# Patient Record
Sex: MALE | Race: WHITE | Employment: UNEMPLOYED | URBAN - METROPOLITAN AREA
[De-identification: names, ages, dates, MRNs, and addresses within clinical notes are randomized per-mention and may not be internally consistent; named-entity substitution may affect disease eponyms.]

---

## 2017-01-22 ENCOUNTER — HOSPITAL ENCOUNTER (INPATIENT)
Age: 52
LOS: 2 days | Discharge: HOME | DRG: 638 | End: 2017-01-24
Attending: EMERGENCY MEDICINE | Admitting: INTERNAL MEDICINE
Payer: MEDICARE

## 2017-01-22 ENCOUNTER — APPOINTMENT (OUTPATIENT)
Dept: GENERAL RADIOLOGY | Age: 52
DRG: 638 | End: 2017-01-22
Attending: INTERNAL MEDICINE
Payer: MEDICARE

## 2017-01-22 DIAGNOSIS — E86.0 DEHYDRATION: ICD-10-CM

## 2017-01-22 DIAGNOSIS — E10.10 DIABETIC KETOACIDOSIS WITHOUT COMA ASSOCIATED WITH TYPE 1 DIABETES MELLITUS (HCC): Primary | ICD-10-CM

## 2017-01-22 PROBLEM — E11.10 DKA (DIABETIC KETOACIDOSES): Status: ACTIVE | Noted: 2017-01-22

## 2017-01-22 LAB
ALBUMIN SERPL BCP-MCNC: 4.1 G/DL (ref 3.5–5)
ALBUMIN/GLOB SERPL: 1.3 {RATIO} (ref 1.1–2.2)
ALP SERPL-CCNC: 87 U/L (ref 45–117)
ALT SERPL-CCNC: 23 U/L (ref 12–78)
AMYLASE SERPL-CCNC: 31 U/L (ref 25–115)
ANION GAP BLD CALC-SCNC: 25 MMOL/L (ref 5–15)
ARTERIAL PATENCY WRIST A: ABNORMAL
AST SERPL W P-5'-P-CCNC: 15 U/L (ref 15–37)
BASE DEFICIT BLDV-SCNC: 20 MMOL/L
BASOPHILS # BLD AUTO: 0 K/UL (ref 0–0.1)
BASOPHILS # BLD: 0 % (ref 0–1)
BDY SITE: ABNORMAL
BILIRUB SERPL-MCNC: 0.9 MG/DL (ref 0.2–1)
BUN SERPL-MCNC: 24 MG/DL (ref 6–20)
BUN/CREAT SERPL: 18 (ref 12–20)
CALCIUM SERPL-MCNC: 8.7 MG/DL (ref 8.5–10.1)
CHLORIDE SERPL-SCNC: 88 MMOL/L (ref 97–108)
CK MB CFR SERPL CALC: 1.1 % (ref 0–2.5)
CK MB SERPL-MCNC: 1 NG/ML (ref 5–25)
CK SERPL-CCNC: 91 U/L (ref 39–308)
CO2 SERPL-SCNC: 10 MMOL/L (ref 21–32)
CREAT SERPL-MCNC: 1.35 MG/DL (ref 0.7–1.3)
DIFFERENTIAL METHOD BLD: ABNORMAL
EOSINOPHIL # BLD: 0 K/UL (ref 0–0.4)
EOSINOPHIL NFR BLD: 0 % (ref 0–7)
ERYTHROCYTE [DISTWIDTH] IN BLOOD BY AUTOMATED COUNT: 12.6 % (ref 11.5–14.5)
EST. AVERAGE GLUCOSE BLD GHB EST-MCNC: 292 MG/DL
GAS FLOW.O2 O2 DELIVERY SYS: ABNORMAL L/MIN
GLOBULIN SER CALC-MCNC: 3.2 G/DL (ref 2–4)
GLUCOSE BLD STRIP.AUTO-MCNC: 129 MG/DL (ref 65–100)
GLUCOSE BLD STRIP.AUTO-MCNC: 135 MG/DL (ref 65–100)
GLUCOSE BLD STRIP.AUTO-MCNC: 168 MG/DL (ref 65–100)
GLUCOSE BLD STRIP.AUTO-MCNC: 231 MG/DL (ref 65–100)
GLUCOSE BLD STRIP.AUTO-MCNC: 304 MG/DL (ref 65–100)
GLUCOSE BLD STRIP.AUTO-MCNC: 404 MG/DL (ref 65–100)
GLUCOSE BLD STRIP.AUTO-MCNC: 433 MG/DL (ref 65–100)
GLUCOSE SERPL-MCNC: 438 MG/DL (ref 65–100)
HBA1C MFR BLD: 11.8 % (ref 4.2–6.3)
HCO3 BLDV-SCNC: 9.3 MMOL/L (ref 23–28)
HCT VFR BLD AUTO: 46.2 % (ref 36.6–50.3)
HGB BLD-MCNC: 16.2 G/DL (ref 12.1–17)
LIPASE SERPL-CCNC: 58 U/L (ref 73–393)
LYMPHOCYTES # BLD AUTO: 15 % (ref 12–49)
LYMPHOCYTES # BLD: 2.4 K/UL (ref 0.8–3.5)
MAGNESIUM SERPL-MCNC: 1.7 MG/DL (ref 1.6–2.4)
MCH RBC QN AUTO: 31.9 PG (ref 26–34)
MCHC RBC AUTO-ENTMCNC: 35.1 G/DL (ref 30–36.5)
MCV RBC AUTO: 90.9 FL (ref 80–99)
MONOCYTES # BLD: 1 K/UL (ref 0–1)
MONOCYTES NFR BLD AUTO: 6 % (ref 5–13)
NEUTS SEG # BLD: 12.6 K/UL (ref 1.8–8)
NEUTS SEG NFR BLD AUTO: 79 % (ref 32–75)
O2/TOTAL GAS SETTING VFR VENT: 0.21 %
PCO2 BLDV: 27.4 MMHG (ref 41–51)
PH BLDV: 7.14 [PH] (ref 7.32–7.42)
PLATELET # BLD AUTO: 359 K/UL (ref 150–400)
PO2 BLDV: 34 MMHG (ref 25–40)
POTASSIUM SERPL-SCNC: 4.9 MMOL/L (ref 3.5–5.1)
PROT SERPL-MCNC: 7.3 G/DL (ref 6.4–8.2)
RBC # BLD AUTO: 5.08 M/UL (ref 4.1–5.7)
RBC MORPH BLD: ABNORMAL
SAO2 % BLDV: 49 % (ref 65–88)
SERVICE CMNT-IMP: ABNORMAL
SODIUM SERPL-SCNC: 123 MMOL/L (ref 136–145)
SPECIMEN TYPE: ABNORMAL
TROPONIN I SERPL-MCNC: <0.04 NG/ML
VALPROATE SERPL-MCNC: 14 UG/ML (ref 50–100)
WBC # BLD AUTO: 16 K/UL (ref 4.1–11.1)

## 2017-01-22 PROCEDURE — 74011250636 HC RX REV CODE- 250/636: Performed by: EMERGENCY MEDICINE

## 2017-01-22 PROCEDURE — 80164 ASSAY DIPROPYLACETIC ACD TOT: CPT | Performed by: INTERNAL MEDICINE

## 2017-01-22 PROCEDURE — 96365 THER/PROPH/DIAG IV INF INIT: CPT

## 2017-01-22 PROCEDURE — 36415 COLL VENOUS BLD VENIPUNCTURE: CPT | Performed by: INTERNAL MEDICINE

## 2017-01-22 PROCEDURE — 82803 BLOOD GASES ANY COMBINATION: CPT

## 2017-01-22 PROCEDURE — 82150 ASSAY OF AMYLASE: CPT | Performed by: INTERNAL MEDICINE

## 2017-01-22 PROCEDURE — 84484 ASSAY OF TROPONIN QUANT: CPT | Performed by: INTERNAL MEDICINE

## 2017-01-22 PROCEDURE — 83036 HEMOGLOBIN GLYCOSYLATED A1C: CPT | Performed by: EMERGENCY MEDICINE

## 2017-01-22 PROCEDURE — 74011250637 HC RX REV CODE- 250/637: Performed by: INTERNAL MEDICINE

## 2017-01-22 PROCEDURE — 85025 COMPLETE CBC W/AUTO DIFF WBC: CPT | Performed by: EMERGENCY MEDICINE

## 2017-01-22 PROCEDURE — 82962 GLUCOSE BLOOD TEST: CPT

## 2017-01-22 PROCEDURE — 71010 XR CHEST PORT: CPT

## 2017-01-22 PROCEDURE — 82550 ASSAY OF CK (CPK): CPT | Performed by: INTERNAL MEDICINE

## 2017-01-22 PROCEDURE — 65620000000 HC RM CCU GENERAL

## 2017-01-22 PROCEDURE — 93005 ELECTROCARDIOGRAM TRACING: CPT

## 2017-01-22 PROCEDURE — 74011636637 HC RX REV CODE- 636/637: Performed by: EMERGENCY MEDICINE

## 2017-01-22 PROCEDURE — 83735 ASSAY OF MAGNESIUM: CPT | Performed by: EMERGENCY MEDICINE

## 2017-01-22 PROCEDURE — 74011000258 HC RX REV CODE- 258: Performed by: INTERNAL MEDICINE

## 2017-01-22 PROCEDURE — 80307 DRUG TEST PRSMV CHEM ANLYZR: CPT | Performed by: INTERNAL MEDICINE

## 2017-01-22 PROCEDURE — 99285 EMERGENCY DEPT VISIT HI MDM: CPT

## 2017-01-22 PROCEDURE — 80053 COMPREHEN METABOLIC PANEL: CPT | Performed by: EMERGENCY MEDICINE

## 2017-01-22 PROCEDURE — 81001 URINALYSIS AUTO W/SCOPE: CPT | Performed by: EMERGENCY MEDICINE

## 2017-01-22 PROCEDURE — 83690 ASSAY OF LIPASE: CPT | Performed by: INTERNAL MEDICINE

## 2017-01-22 PROCEDURE — 74011000258 HC RX REV CODE- 258: Performed by: EMERGENCY MEDICINE

## 2017-01-22 PROCEDURE — 74011250636 HC RX REV CODE- 250/636: Performed by: INTERNAL MEDICINE

## 2017-01-22 PROCEDURE — A9270 NON-COVERED ITEM OR SERVICE: HCPCS | Performed by: EMERGENCY MEDICINE

## 2017-01-22 RX ORDER — ACETAMINOPHEN 325 MG/1
650 TABLET ORAL
Status: DISCONTINUED | OUTPATIENT
Start: 2017-01-22 | End: 2017-01-24 | Stop reason: HOSPADM

## 2017-01-22 RX ORDER — MAGNESIUM SULFATE 100 %
4 CRYSTALS MISCELLANEOUS AS NEEDED
Status: DISCONTINUED | OUTPATIENT
Start: 2017-01-22 | End: 2017-01-24 | Stop reason: HOSPADM

## 2017-01-22 RX ORDER — SODIUM CHLORIDE 0.9 % (FLUSH) 0.9 %
5-10 SYRINGE (ML) INJECTION AS NEEDED
Status: DISCONTINUED | OUTPATIENT
Start: 2017-01-22 | End: 2017-01-24 | Stop reason: HOSPADM

## 2017-01-22 RX ORDER — SODIUM CHLORIDE 0.9 % (FLUSH) 0.9 %
5-10 SYRINGE (ML) INJECTION EVERY 8 HOURS
Status: DISCONTINUED | OUTPATIENT
Start: 2017-01-22 | End: 2017-01-24 | Stop reason: HOSPADM

## 2017-01-22 RX ORDER — HYDROMORPHONE HYDROCHLORIDE 1 MG/ML
1 INJECTION, SOLUTION INTRAMUSCULAR; INTRAVENOUS; SUBCUTANEOUS
Status: DISCONTINUED | OUTPATIENT
Start: 2017-01-22 | End: 2017-01-24 | Stop reason: HOSPADM

## 2017-01-22 RX ORDER — SODIUM CHLORIDE 9 MG/ML
150 INJECTION, SOLUTION INTRAVENOUS CONTINUOUS
Status: DISCONTINUED | OUTPATIENT
Start: 2017-01-22 | End: 2017-01-22

## 2017-01-22 RX ORDER — DEXTROSE MONOHYDRATE AND SODIUM CHLORIDE 5; .9 G/100ML; G/100ML
150 INJECTION, SOLUTION INTRAVENOUS CONTINUOUS
Status: DISCONTINUED | OUTPATIENT
Start: 2017-01-22 | End: 2017-01-23

## 2017-01-22 RX ORDER — DEXTROSE 50 % IN WATER (D50W) INTRAVENOUS SYRINGE
12.5-25 AS NEEDED
Status: DISCONTINUED | OUTPATIENT
Start: 2017-01-22 | End: 2017-01-24 | Stop reason: HOSPADM

## 2017-01-22 RX ORDER — GUAIFENESIN 100 MG/5ML
81 LIQUID (ML) ORAL DAILY
Status: DISCONTINUED | OUTPATIENT
Start: 2017-01-23 | End: 2017-01-24 | Stop reason: HOSPADM

## 2017-01-22 RX ORDER — ONDANSETRON 2 MG/ML
4 INJECTION INTRAMUSCULAR; INTRAVENOUS
Status: DISCONTINUED | OUTPATIENT
Start: 2017-01-22 | End: 2017-01-24 | Stop reason: HOSPADM

## 2017-01-22 RX ORDER — INSULIN LISPRO 100 [IU]/ML
INJECTION, SOLUTION INTRAVENOUS; SUBCUTANEOUS
Status: DISCONTINUED | OUTPATIENT
Start: 2017-01-22 | End: 2017-01-23

## 2017-01-22 RX ORDER — LISINOPRIL 20 MG/1
20 TABLET ORAL DAILY
Status: DISCONTINUED | OUTPATIENT
Start: 2017-01-23 | End: 2017-01-24 | Stop reason: HOSPADM

## 2017-01-22 RX ORDER — ENOXAPARIN SODIUM 100 MG/ML
40 INJECTION SUBCUTANEOUS EVERY 24 HOURS
Status: DISCONTINUED | OUTPATIENT
Start: 2017-01-22 | End: 2017-01-24 | Stop reason: HOSPADM

## 2017-01-22 RX ORDER — HYDROCODONE BITARTRATE AND ACETAMINOPHEN 5; 325 MG/1; MG/1
1 TABLET ORAL
Status: DISCONTINUED | OUTPATIENT
Start: 2017-01-22 | End: 2017-01-24 | Stop reason: HOSPADM

## 2017-01-22 RX ORDER — ZIPRASIDONE HYDROCHLORIDE 20 MG/1
80 CAPSULE ORAL 2 TIMES DAILY WITH MEALS
Status: DISCONTINUED | OUTPATIENT
Start: 2017-01-23 | End: 2017-01-24 | Stop reason: HOSPADM

## 2017-01-22 RX ORDER — DIVALPROEX SODIUM 500 MG/1
500 TABLET, EXTENDED RELEASE ORAL 3 TIMES DAILY
Status: DISCONTINUED | OUTPATIENT
Start: 2017-01-22 | End: 2017-01-24 | Stop reason: HOSPADM

## 2017-01-22 RX ADMIN — ENOXAPARIN SODIUM 40 MG: 40 INJECTION SUBCUTANEOUS at 20:40

## 2017-01-22 RX ADMIN — SODIUM CHLORIDE 1000 ML: 900 INJECTION, SOLUTION INTRAVENOUS at 18:47

## 2017-01-22 RX ADMIN — HUMAN INSULIN 10 UNITS: 100 INJECTION, SOLUTION SUBCUTANEOUS at 17:30

## 2017-01-22 RX ADMIN — SODIUM CHLORIDE 150 ML/HR: 900 INJECTION, SOLUTION INTRAVENOUS at 18:47

## 2017-01-22 RX ADMIN — SODIUM CHLORIDE 1000 ML: 900 INJECTION, SOLUTION INTRAVENOUS at 20:41

## 2017-01-22 RX ADMIN — Medication 10 ML: at 21:16

## 2017-01-22 RX ADMIN — SODIUM CHLORIDE 7.5 UNITS/HR: 900 INJECTION, SOLUTION INTRAVENOUS at 17:38

## 2017-01-22 RX ADMIN — DIVALPROEX SODIUM 500 MG: 500 TABLET, EXTENDED RELEASE ORAL at 21:16

## 2017-01-22 RX ADMIN — DEXTROSE MONOHYDRATE AND SODIUM CHLORIDE 150 ML/HR: 5; .9 INJECTION, SOLUTION INTRAVENOUS at 21:42

## 2017-01-22 NOTE — ED PROVIDER NOTES
HPI Comments: 46 y.o. male with past medical history significant for DM, HTN, appendectomy, who presents with chief complaint of vomiting. Pt reports that he has been out of his long acting humulin for the past three days, and has only been taking his short acting humalog with meals, but has had BS consistently over 300. He reports sxs of nausea/vomiting, despite trying to drink fluids, in addition to polyuria, headache, and a mild cough. Pt states that his endocrinologist called in the Rx, but that his pharmacy never received the Rx. He reports hx of DKA. He states that PTA his BS was 520. Pt denies fever. There are no other acute medical concerns at this time. PCP: Mae De León MD  Endocrinologist: Camille Burden MD    Note written by Alice Solitario, as dictated by Tawanna Millan MD 4:34 PM      The history is provided by the patient. Past Medical History:   Diagnosis Date    [facility]ipolar 1 disorder ([facility]     Depressive disorder     Diabetes ([facility]      Type 1    Hypertension     Psychiatric disorder      bipolar    PTSD (post-traumatic stress disorder)     PTSD (post-traumatic stress disorder)        Past Surgical History:   Procedure Laterality Date    Hx appendectomy           History reviewed. No pertinent family history. Social History     Social History    Marital status:      Spouse name: N/A    Number of children: N/A    Years of education: N/A     Occupational History    Not on file. Social History Main Topics    Smoking status: Current Every Day Smoker     Packs/day: 1.00     Years: 0.00    Smokeless tobacco: Not on file    Alcohol use No      Comment: occasion    Drug use: Yes     Special: Heroin      Comment: Uses Heroin everyday    Sexual activity: Not on file     Other Topics Concern    Not on file     Social History Narrative         ALLERGIES: Review of patient's allergies indicates no known allergies.     Review of Systems   Constitutional: Negative for activity change and fever. HENT: Negative for congestion. Eyes: Negative for pain. Respiratory: Positive for cough. Negative for shortness of breath. Cardiovascular: Negative for chest pain and leg swelling. Gastrointestinal: Positive for nausea and vomiting. Negative for abdominal pain. Endocrine: Positive for polydipsia, polyphagia and polyuria. Genitourinary: Positive for frequency (increased). Negative for flank pain and hematuria. Musculoskeletal: Negative for gait problem, neck pain and neck stiffness. Skin: Negative for color change. Neurological: Positive for headaches. Negative for speech difficulty. Hematological: Does not bruise/bleed easily. Psychiatric/Behavioral: Negative for confusion. All other systems reviewed and are negative. Vitals:    01/22/17 1618   BP: 133/84   Pulse: (!) 142   Resp: 14   Temp: 97.8 °F (36.6 °C)   SpO2: 96%   Weight: 73 kg (161 lb)   Height: 5' 9\" (1.753 m)            Physical Exam   Constitutional: He is oriented to person, place, and time. He appears well-developed and well-nourished. No distress. HENT:   Head: Normocephalic and atraumatic. Right Ear: External ear normal.   Left Ear: External ear normal.   Mouth/Throat: Mucous membranes are dry. Eyes: EOM are normal. Pupils are equal, round, and reactive to light. Neck: Normal range of motion. Neck supple. No JVD present. No tracheal deviation present. Cardiovascular: Regular rhythm and normal heart sounds. Tachycardia present. Exam reveals no gallop and no friction rub. No murmur heard. 's-130's. Good pulses in all 4 extremities. Pulmonary/Chest: Effort normal and breath sounds normal. No stridor. No respiratory distress. He has no wheezes. He has no rales. Abdominal: Soft. Bowel sounds are normal. He exhibits no distension. There is no tenderness. There is no rebound and no guarding. Musculoskeletal: Normal range of motion.  He exhibits no edema or tenderness. No leg edema   Neurological: He is alert and oriented to person, place, and time. He has normal reflexes. No cranial nerve deficit. Coordination normal.   Skin: Skin is warm and dry. No rash noted. He is not diaphoretic. No erythema. Psychiatric: He has a normal mood and affect. His behavior is normal. Judgment and thought content normal.   Nursing note and vitals reviewed. Note written by Darylene Abbot, Scribe, as dictated by Charleen Guerra MD 4:35 PM      MDM  Number of Diagnoses or Management Options  Dehydration:   Diabetic ketoacidosis without coma associated with type 1 diabetes mellitus [facility]):   Diagnosis management comments: 66-year-old white male with history of type 1 diabetes presents to the emergency department with hyperglycemia. Patient has been out of his long-acting insulin for the last several days. Patient presents with dehydration, urinary frequency, elevated blood sugar. We'll check for DKA. Will also give IV fluids. We'll reassess when testing his back. Patient agrees with this plan.        Amount and/or Complexity of Data Reviewed  Clinical lab tests: ordered and reviewed  Tests in the radiology section of CPT®: reviewed and ordered  Tests in the medicine section of CPT®: ordered and reviewed  Discussion of test results with the performing providers: yes  Decide to obtain previous medical records or to obtain history from someone other than the patient: yes  Obtain history from someone other than the patient: yes  Review and summarize past medical records: yes  Discuss the patient with other providers: yes  Independent visualization of images, tracings, or specimens: yes    Risk of Complications, Morbidity, and/or Mortality  Presenting problems: high  Diagnostic procedures: high  Management options: high    Critical Care  Total time providing critical care: 30-74 minutes    Patient Progress  Patient progress: improved    ED Course       Procedures    ED EKG interpretation:  Rhythm: sinus tachycardia; and regular . Rate (approx.): 113; Axis: left axis deviation; ST/T wave: no elevations or depressions; normal intervals  Note written by Alice Quan, as dictated by Sky Stahl MD 4:45 PM       PROGRESS NOTE:  4:46 PM  Will start with one dose of 10 units insulin until we know if pt is in DKA. Labs reviewed. PH is 7.1, glucose in the 400s    Bicarb is 10, AG is 25    PROGRESS NOTE:  5:07 PM  Pt is confirmed to be in DKA. Will continue IV fluids and start insulin drip. Total critical care time spend exclusive of procedures:  35 mins    Pt is now on insulin drip    IVF running as well    CONSULT NOTE:  5:20 PM Sky Stahl MD spoke with Dr. Lizeth Bhatti, Consult for Hospitalist.  Discussed available diagnostic tests and clinical findings. Dr. Salinas Sanchez will admit.

## 2017-01-22 NOTE — PROGRESS NOTES
Admission Medication Reconciliation:    Information obtained from: Patient    Significant PMH/Disease States:   Past Medical History   Diagnosis Date    [facility] 1 disorder ([facility]     Depressive disorder     Diabetes ([facility]      Type 1    Hypertension     Psychiatric disorder      bipolar    PTSD (post-traumatic stress disorder)     PTSD (post-traumatic stress disorder)        Chief Complaint for this Admission:  Elevated BG    Allergies:  Review of patient's allergies indicates no known allergies. Prior to Admission Medications:   Prior to Admission Medications   Prescriptions Last Dose Informant Patient Reported? Taking?   aspirin 81 mg chewable tablet Not Taking at Unknown time  Yes No   Sig: Take 81 mg by mouth daily. divalproex ER (DEPAKOTE ER) 500 mg ER tablet 2017 at Unknown time  Yes Yes   Sig: Take 500 mg by mouth three (3) times daily. insulin NPH (NOVOLIN N, HUMULIN N) 100 unit/mL injection   Yes No   Si Units by SubCUTAneous route ACB/HS. Indications: DIABETES MELLITUS   insulin lispro (HUMALOG) 100 unit/mL injection 2017 at 0800  No Yes   Sig: Take 10 units , subcutaneously , prior to breakfast, lunch and dinner. IF your blood sugar >200 take 20 units subcutaneously, IF your blood sugar is >300 take 30 units subcutaneously. IF your blood sugar is >400 call your physician or come to ER. Patient taking differently: Take 10 units , subcutaneously , prior to breakfast, lunch and dinner. IF your blood sugar >200 take 15 units subcutaneously, IF your blood sugar is >300 take 20 units subcutaneously. IF your blood sugar is >400 call your physician or come to ER. lisinopril (PRINIVIL, ZESTRIL) 20 mg tablet 1/15/2017 at Unknown time  Yes Yes   Sig: Take 20 mg by mouth daily. ziprasidone (GEODON) 80 mg capsule 2017 at Unknown time  Yes Yes   Sig: Take 80 mg by mouth two (2) times daily (with meals).       Facility-Administered Medications: None         Comments/Recommendations: Updated medication list to reflect current meds. Deleted trazodone, citalopram. Updated sliding scale.     Maggie Hickman, RaphaelD, BCPS

## 2017-01-22 NOTE — ROUTINE PROCESS
TRANSFER - OUT REPORT:    Verbal report given to 1200 Magdy Olmedo RN(name) on Ed Grace  being transferred to CCU 24(unit) for routine progression of care       Report consisted of patients Situation, Background, Assessment and   Recommendations(SBAR). Information from the following report(s) SBAR and Kardex was reviewed with the receiving nurse. Lines:       Opportunity for questions and clarification was provided.       Patient transported with:   [employer]

## 2017-01-22 NOTE — IP AVS SNAPSHOT
[address] 
[phone] Patient: Adele Martines MRN: [id number] CJN:[id number] You are allergic to the following No active allergies Recent Documentation Height Weight BMI Smoking Status 1.753 m 77 kg 25.07 kg/m2 Current Every Day Smoker Emergency Contacts Name Discharge Info Relation Home Work Mobile Jean-Paul Mena DISCHARGE CAREGIVER [3] Father [15] [phone] About your hospitalization You were admitted on:  January 22, 2017 You last received care in the:  [facility] 4 [facility] You were discharged on:  January 24, 2017 Unit phone number:  [phone] Why you were hospitalized Your primary diagnosis was:  Dka (Diabetic Ketoacidoses) (Hcc) Your diagnoses also included:  Dka (Diabetic Ketoacidoses) (Hcc) Providers Seen During Your Hospitalizations Provider Role Specialty Primary office phone Andrew Brito MD Attending Provider Emergency Medicine [phone] Anita Peters MD Attending Provider Internal Medicine [phone] Don Goodman MD Attending Provider Internal Medicine [phone] Your Primary Care Physician (PCP) Primary Care Physician Office Phone Office Fax Jumafela Ashlie [phone] Follow-up Information Follow up With Details Comments Contact Info Panfilo Lloyd MD Go on 2/2/2017 Appointment at 3:00pm, For follow up after hospitalization [address] [phone] Current Discharge Medication List  
  
START taking these medications Dose & Instructions Dispensing Information Comments Morning Noon Evening Bedtime  
 phosphorus 250 mg tablet Commonly known as:  K PHOS NEUTRAL Your next dose is: Today, Tomorrow Other:  _________ Dose:  1 Tab Take 1 Tab by mouth three (3) times daily (with meals) for 9 doses. Indications: HYPOPHOSPHATEMIA Quantity:  9 Tab Refills:  0 CONTINUE these medications which have CHANGED Dose & Instructions Dispensing Information Comments Morning Noon Evening Bedtime  
 insulin lispro 100 unit/mL injection Commonly known as:  HumaLOG What changed:  additional instructions Your next dose is: Today, Tomorrow Other:  _________ Take 10 units , subcutaneously , prior to breakfast, lunch and dinner. IF your blood sugar >200 take 20 units subcutaneously, IF your blood sugar is >300 take 30 units subcutaneously. IF your blood sugar is >400 call your physician or come to ER. Quantity:  1 Vial  
Refills:  6 CONTINUE these medications which have NOT CHANGED Dose & Instructions Dispensing Information Comments Morning Noon Evening Bedtime  
 aspirin 81 mg chewable tablet Your next dose is: Today, Tomorrow Other:  _________ Dose:  81 mg Take 81 mg by mouth daily. Refills:  0  
     
   
   
   
  
 DEPAKOTE  mg ER tablet Generic drug:  divalproex ER Your next dose is: Today, Tomorrow Other:  _________ Dose:  500 mg Take 500 mg by mouth three (3) times daily. Refills:  0  
     
   
   
   
  
 GEODON 80 mg capsule Generic drug:  ziprasidone Your next dose is: Today, Tomorrow Other:  _________ Dose:  80 mg Take 80 mg by mouth two (2) times daily (with meals). Refills:  0  
     
   
   
   
  
 insulin  unit/mL injection Commonly known as:  Kathalene Corpus Your next dose is: Today, Tomorrow Other:  _________ Dose:  40 Units 40 Units by SubCUTAneous route ACB/HS. Indications: DIABETES MELLITUS Refills:  0  
     
   
   
   
  
 lisinopril 20 mg tablet Commonly known as:  Kamlesh Lange  
   
 Your next dose is: Today, Tomorrow Other:  _________ Dose:  20 mg Take 20 mg by mouth daily. Refills:  0 Where to Get Your Medications These medications were sent to [address] [place] [address], [facility] [address] [place] [address] Ruddy Ryan [id number] Phone:  [phone] phosphorus 250 mg tablet Discharge Instructions Please bring this form with you to show your primary care provider at your follow-up appointment. Primary care provider:  Dr. Clover Mott MD 
 
Discharging provider:  Bettina Bonner MD 
 
You have been admitted to the hospital with the following diagnoses: 
[facility] FOLLOW-UP CARE RECOMMENDATIONS: 
 
APPOINTMENTS: 
Follow-up Information Follow up With Details Comments Contact Info Clover Mott MD Go on 2017 Appointment at 3:00pm, For follow up after hospitalization [address] [place] [address] 
[phone] FOLLOW-UP TESTS recommended: Basic metabolic panel and phosphorus level in 1 week with your primary care doctor SYMPTOMS to watch for: nausea, vomiting, change in mentation, falling, weakness, sweating, low blood sugar. DIET/what to eat:  Diabetic Diet ACTIVITY:  Activity as tolerated What to do if new or unexpected symptoms occur? If you experience any of the above symptoms (or should other concerns or questions arise after discharge) please call your primary care physician. Return to the emergency room if you cannot get hold of your doctor. · It is very important that you keep your follow-up appointment(s). · Please bring discharge papers, medication list (and/or medication bottles) to your follow-up appointments for review by your outpatient provider(s).  
· Please check the list of medications and be sure it includes every medication (even non-prescription medications) that your provider wants you to take. · It is important that you take the medication exactly as they are prescribed. · Keep your medication in the bottles provided by the pharmacist and keep a list of the medication names, dosages, and times to be taken in your wallet. · Do not take other medications without consulting your doctor. · If you have any questions about your medications or other instructions, please talk to your nurse or care provider before you leave the hospital. 
 
I understand that if any problems occur once I am at home I am to contact my physician. These instructions were explained to me and I had the opportunity to ask questions. Discharge Orders None NetManage Announcement We are excited to announce that we are making your provider's discharge notes available to you in NetManage. You will see these notes when they are completed and signed by the physician that discharged you from your recent hospital stay. If you have any questions or concerns about any information you see in NetManage, please call the Health Information Department where you were seen or reach out to your Primary Care Provider for more information about your plan of care. Introducing [facility]! Chelsi Padilla introduces NetManage patient portal. Now you can access parts of your medical record, email your doctor's office, and request medication refills online. 1. In your internet browser, go to [email or web address] 2. Click on the First Time User? Click Here link in the Sign In box. You will see the New Member Sign Up page. 3. Enter your NetManage Access Code exactly as it appears below. You will not need to use this code after youve completed the sign-up process. If you do not sign up before the expiration date, you must request a new code. · NetManage Access Code: [email or web address] Expires: 4/5/2017  1:48 PM 
 
 4. Enter the last four digits of your Social Security Number (xxxx) and Date of Birth (mm/dd/yyyy) as indicated and click Submit. You will be taken to the next sign-up page. 5. Create a Netrounds ID. This will be your Netrounds login ID and cannot be changed, so think of one that is secure and easy to remember. 6. Create a Netrounds password. You can change your password at any time. 7. Enter your Password Reset Question and Answer. This can be used at a later time if you forget your password. 8. Enter your e-mail address. You will receive e-mail notification when new information is available in [address] 9. Click Sign Up. You can now view and download portions of your medical record. 10. Click the Download Summary menu link to download a portable copy of your medical information. If you have questions, please visit the Frequently Asked Questions section of the Netrounds website. Remember, Netrounds is NOT to be used for urgent needs. For medical emergencies, dial 911. Now available from your iPhone and Android! General Information Please provide this summary of care documentation to your next provider. Patient Signature:  ____________________________________________________________ Date:  ____________________________________________________________  
  
Roxana Wharton Provider Signature:  ____________________________________________________________ Date:  ____________________________________________________________

## 2017-01-22 NOTE — ED TRIAGE NOTES
Triage Note:  \"I have type one diabetes and my meter says its high. I have been vomiting and urinating a lot. I ran out of Insulin on Thursday. \"

## 2017-01-22 NOTE — PROGRESS NOTES
1850:  TRANSFER - IN REPORT:    Verbal report received from Catherine(name) on Leopoldo Kocher  being received from ED(unit) for routine progression of care      Report consisted of patients Situation, Background, Assessment and   Recommendations(SBAR). Information from the following report(s) SBAR, Kardex, Procedure Summary, Intake/Output, MAR, Recent Results, Med Rec Status and Cardiac Rhythm ST was reviewed with the receiving nurse. Opportunity for questions and clarification was provided. Assessment completed upon patients arrival to unit and care assumed. 1945:  Bedside and Verbal shift change report given to Kizzy (oncoming nurse) by Ady Jacobo (offgoing nurse).  Report included the following information SBAR, Kardex, Procedure Summary, Intake/Output, MAR, Recent Results, Med Rec Status and Cardiac Rhythm ST.

## 2017-01-23 LAB
ALBUMIN SERPL BCP-MCNC: 3.4 G/DL (ref 3.5–5)
ALBUMIN/GLOB SERPL: 1.3 {RATIO} (ref 1.1–2.2)
ALP SERPL-CCNC: 68 U/L (ref 45–117)
ALT SERPL-CCNC: 18 U/L (ref 12–78)
AMPHET UR QL SCN: NEGATIVE
ANION GAP BLD CALC-SCNC: 18 MMOL/L (ref 5–15)
ANION GAP BLD CALC-SCNC: 21 MMOL/L (ref 5–15)
ANION GAP BLD CALC-SCNC: 9 MMOL/L (ref 5–15)
APPEARANCE UR: CLEAR
AST SERPL W P-5'-P-CCNC: 14 U/L (ref 15–37)
ATRIAL RATE: 104 BPM
BACTERIA URNS QL MICRO: NEGATIVE /HPF
BARBITURATES UR QL SCN: NEGATIVE
BASOPHILS # BLD AUTO: 0 K/UL (ref 0–0.1)
BASOPHILS # BLD: 0 % (ref 0–1)
BENZODIAZ UR QL: NEGATIVE
BILIRUB SERPL-MCNC: 0.6 MG/DL (ref 0.2–1)
BILIRUB UR QL CFM: NEGATIVE
BUN SERPL-MCNC: 10 MG/DL (ref 6–20)
BUN SERPL-MCNC: 12 MG/DL (ref 6–20)
BUN SERPL-MCNC: 14 MG/DL (ref 6–20)
BUN/CREAT SERPL: 12 (ref 12–20)
BUN/CREAT SERPL: 14 (ref 12–20)
BUN/CREAT SERPL: 16 (ref 12–20)
CALCIUM SERPL-MCNC: 7.3 MG/DL (ref 8.5–10.1)
CALCIUM SERPL-MCNC: 7.4 MG/DL (ref 8.5–10.1)
CALCIUM SERPL-MCNC: 7.5 MG/DL (ref 8.5–10.1)
CALCULATED R AXIS, ECG10: -103 DEGREES
CALCULATED T AXIS, ECG11: 31 DEGREES
CANNABINOIDS UR QL SCN: NEGATIVE
CHLORIDE SERPL-SCNC: 100 MMOL/L (ref 97–108)
CHLORIDE SERPL-SCNC: 101 MMOL/L (ref 97–108)
CHLORIDE SERPL-SCNC: 97 MMOL/L (ref 97–108)
CK MB CFR SERPL CALC: NORMAL % (ref 0–2.5)
CK MB SERPL-MCNC: <1 NG/ML (ref 5–25)
CK SERPL-CCNC: 72 U/L (ref 39–308)
CO2 SERPL-SCNC: 11 MMOL/L (ref 21–32)
CO2 SERPL-SCNC: 21 MMOL/L (ref 21–32)
CO2 SERPL-SCNC: 6 MMOL/L (ref 21–32)
COCAINE UR QL SCN: NEGATIVE
COLOR UR: ABNORMAL
CREAT SERPL-MCNC: 0.84 MG/DL (ref 0.7–1.3)
CREAT SERPL-MCNC: 0.86 MG/DL (ref 0.7–1.3)
CREAT SERPL-MCNC: 0.87 MG/DL (ref 0.7–1.3)
DIAGNOSIS, 93000: NORMAL
DIFFERENTIAL METHOD BLD: ABNORMAL
DRUG SCRN COMMENT,DRGCM: NORMAL
EOSINOPHIL # BLD: 0 K/UL (ref 0–0.4)
EOSINOPHIL NFR BLD: 0 % (ref 0–7)
EPITH CASTS URNS QL MICRO: ABNORMAL /LPF
ERYTHROCYTE [DISTWIDTH] IN BLOOD BY AUTOMATED COUNT: 12.8 % (ref 11.5–14.5)
EST. AVERAGE GLUCOSE BLD GHB EST-MCNC: 312 MG/DL
GLOBULIN SER CALC-MCNC: 2.7 G/DL (ref 2–4)
GLUCOSE BLD STRIP.AUTO-MCNC: 107 MG/DL (ref 65–100)
GLUCOSE BLD STRIP.AUTO-MCNC: 145 MG/DL (ref 65–100)
GLUCOSE BLD STRIP.AUTO-MCNC: 147 MG/DL (ref 65–100)
GLUCOSE BLD STRIP.AUTO-MCNC: 152 MG/DL (ref 65–100)
GLUCOSE BLD STRIP.AUTO-MCNC: 153 MG/DL (ref 65–100)
GLUCOSE BLD STRIP.AUTO-MCNC: 171 MG/DL (ref 65–100)
GLUCOSE BLD STRIP.AUTO-MCNC: 173 MG/DL (ref 65–100)
GLUCOSE BLD STRIP.AUTO-MCNC: 174 MG/DL (ref 65–100)
GLUCOSE BLD STRIP.AUTO-MCNC: 192 MG/DL (ref 65–100)
GLUCOSE BLD STRIP.AUTO-MCNC: 199 MG/DL (ref 65–100)
GLUCOSE BLD STRIP.AUTO-MCNC: 207 MG/DL (ref 65–100)
GLUCOSE BLD STRIP.AUTO-MCNC: 211 MG/DL (ref 65–100)
GLUCOSE BLD STRIP.AUTO-MCNC: 248 MG/DL (ref 65–100)
GLUCOSE BLD STRIP.AUTO-MCNC: 259 MG/DL (ref 65–100)
GLUCOSE BLD STRIP.AUTO-MCNC: 262 MG/DL (ref 65–100)
GLUCOSE BLD STRIP.AUTO-MCNC: 286 MG/DL (ref 65–100)
GLUCOSE BLD STRIP.AUTO-MCNC: 298 MG/DL (ref 65–100)
GLUCOSE BLD STRIP.AUTO-MCNC: 304 MG/DL (ref 65–100)
GLUCOSE SERPL-MCNC: 230 MG/DL (ref 65–100)
GLUCOSE SERPL-MCNC: 261 MG/DL (ref 65–100)
GLUCOSE SERPL-MCNC: 268 MG/DL (ref 65–100)
GLUCOSE UR STRIP.AUTO-MCNC: >1000 MG/DL
HBA1C MFR BLD: 12.5 % (ref 4.2–6.3)
HCT VFR BLD AUTO: 41 % (ref 36.6–50.3)
HGB BLD-MCNC: 14.3 G/DL (ref 12.1–17)
HGB UR QL STRIP: NEGATIVE
HYALINE CASTS URNS QL MICRO: ABNORMAL /LPF (ref 0–5)
KETONES UR QL STRIP.AUTO: >80 MG/DL
LACTATE SERPL-SCNC: 0.5 MMOL/L (ref 0.4–2)
LEUKOCYTE ESTERASE UR QL STRIP.AUTO: NEGATIVE
LYMPHOCYTES # BLD AUTO: 15 % (ref 12–49)
LYMPHOCYTES # BLD: 2.4 K/UL (ref 0.8–3.5)
MAGNESIUM SERPL-MCNC: 1.5 MG/DL (ref 1.6–2.4)
MAGNESIUM SERPL-MCNC: 2.1 MG/DL (ref 1.6–2.4)
MAGNESIUM SERPL-MCNC: 2.3 MG/DL (ref 1.6–2.4)
MCH RBC QN AUTO: 31.6 PG (ref 26–34)
MCHC RBC AUTO-ENTMCNC: 34.9 G/DL (ref 30–36.5)
MCV RBC AUTO: 90.7 FL (ref 80–99)
METHADONE UR QL: NEGATIVE
MONOCYTES # BLD: 1.1 K/UL (ref 0–1)
MONOCYTES NFR BLD AUTO: 7 % (ref 5–13)
NEUTS SEG # BLD: 12.2 K/UL (ref 1.8–8)
NEUTS SEG NFR BLD AUTO: 78 % (ref 32–75)
NITRITE UR QL STRIP.AUTO: NEGATIVE
OPIATES UR QL: NEGATIVE
P-R INTERVAL, ECG05: 92 MS
PCP UR QL: NEGATIVE
PH UR STRIP: 5.5 [PH] (ref 5–8)
PHOSPHATE SERPL-MCNC: 1.7 MG/DL (ref 2.6–4.7)
PHOSPHATE SERPL-MCNC: 2.5 MG/DL (ref 2.6–4.7)
PLATELET # BLD AUTO: 301 K/UL (ref 150–400)
POTASSIUM SERPL-SCNC: 3.3 MMOL/L (ref 3.5–5.1)
POTASSIUM SERPL-SCNC: 4 MMOL/L (ref 3.5–5.1)
POTASSIUM SERPL-SCNC: 4.8 MMOL/L (ref 3.5–5.1)
PROT SERPL-MCNC: 6.1 G/DL (ref 6.4–8.2)
PROT UR STRIP-MCNC: ABNORMAL MG/DL
Q-T INTERVAL, ECG07: 324 MS
QRS DURATION, ECG06: 92 MS
QTC CALCULATION (BEZET), ECG08: 426 MS
RBC # BLD AUTO: 4.52 M/UL (ref 4.1–5.7)
RBC #/AREA URNS HPF: ABNORMAL /HPF (ref 0–5)
RBC MORPH BLD: ABNORMAL
RBC MORPH BLD: ABNORMAL
SERVICE CMNT-IMP: ABNORMAL
SODIUM SERPL-SCNC: 124 MMOL/L (ref 136–145)
SODIUM SERPL-SCNC: 129 MMOL/L (ref 136–145)
SODIUM SERPL-SCNC: 131 MMOL/L (ref 136–145)
SP GR UR REFRACTOMETRY: 1.03 (ref 1–1.03)
TROPONIN I SERPL-MCNC: <0.04 NG/ML
TSH SERPL DL<=0.05 MIU/L-ACNC: 1.3 UIU/ML (ref 0.36–3.74)
UA: UC IF INDICATED,UAUC: ABNORMAL
UROBILINOGEN UR QL STRIP.AUTO: 0.2 EU/DL (ref 0.2–1)
VENTRICULAR RATE, ECG03: 104 BPM
WBC # BLD AUTO: 15.7 K/UL (ref 4.1–11.1)
WBC URNS QL MICRO: ABNORMAL /HPF (ref 0–4)

## 2017-01-23 PROCEDURE — 74011250637 HC RX REV CODE- 250/637: Performed by: HOSPITALIST

## 2017-01-23 PROCEDURE — 74011000250 HC RX REV CODE- 250: Performed by: INTERNAL MEDICINE

## 2017-01-23 PROCEDURE — 82962 GLUCOSE BLOOD TEST: CPT

## 2017-01-23 PROCEDURE — 65660000001 HC RM ICU INTERMED STEPDOWN

## 2017-01-23 PROCEDURE — 80053 COMPREHEN METABOLIC PANEL: CPT | Performed by: INTERNAL MEDICINE

## 2017-01-23 PROCEDURE — 74011636637 HC RX REV CODE- 636/637: Performed by: EMERGENCY MEDICINE

## 2017-01-23 PROCEDURE — 84484 ASSAY OF TROPONIN QUANT: CPT | Performed by: INTERNAL MEDICINE

## 2017-01-23 PROCEDURE — 74011000258 HC RX REV CODE- 258: Performed by: INTERNAL MEDICINE

## 2017-01-23 PROCEDURE — 65660000000 HC RM CCU STEPDOWN

## 2017-01-23 PROCEDURE — 74011000258 HC RX REV CODE- 258: Performed by: HOSPITALIST

## 2017-01-23 PROCEDURE — 83605 ASSAY OF LACTIC ACID: CPT | Performed by: HOSPITALIST

## 2017-01-23 PROCEDURE — 74011250636 HC RX REV CODE- 250/636: Performed by: INTERNAL MEDICINE

## 2017-01-23 PROCEDURE — 74011250636 HC RX REV CODE- 250/636: Performed by: HOSPITALIST

## 2017-01-23 PROCEDURE — 80048 BASIC METABOLIC PNL TOTAL CA: CPT | Performed by: INTERNAL MEDICINE

## 2017-01-23 PROCEDURE — 82550 ASSAY OF CK (CPK): CPT | Performed by: INTERNAL MEDICINE

## 2017-01-23 PROCEDURE — 84443 ASSAY THYROID STIM HORMONE: CPT | Performed by: INTERNAL MEDICINE

## 2017-01-23 PROCEDURE — 84100 ASSAY OF PHOSPHORUS: CPT | Performed by: INTERNAL MEDICINE

## 2017-01-23 PROCEDURE — 83735 ASSAY OF MAGNESIUM: CPT | Performed by: INTERNAL MEDICINE

## 2017-01-23 PROCEDURE — 74011636637 HC RX REV CODE- 636/637: Performed by: HOSPITALIST

## 2017-01-23 PROCEDURE — 83036 HEMOGLOBIN GLYCOSYLATED A1C: CPT | Performed by: INTERNAL MEDICINE

## 2017-01-23 PROCEDURE — 36415 COLL VENOUS BLD VENIPUNCTURE: CPT | Performed by: INTERNAL MEDICINE

## 2017-01-23 PROCEDURE — 80048 BASIC METABOLIC PNL TOTAL CA: CPT | Performed by: HOSPITALIST

## 2017-01-23 PROCEDURE — 83735 ASSAY OF MAGNESIUM: CPT | Performed by: HOSPITALIST

## 2017-01-23 PROCEDURE — 85025 COMPLETE CBC W/AUTO DIFF WBC: CPT | Performed by: INTERNAL MEDICINE

## 2017-01-23 PROCEDURE — 74011000258 HC RX REV CODE- 258: Performed by: EMERGENCY MEDICINE

## 2017-01-23 PROCEDURE — 74011250637 HC RX REV CODE- 250/637: Performed by: INTERNAL MEDICINE

## 2017-01-23 RX ORDER — MAGNESIUM SULFATE HEPTAHYDRATE 40 MG/ML
2 INJECTION, SOLUTION INTRAVENOUS ONCE
Status: COMPLETED | OUTPATIENT
Start: 2017-01-23 | End: 2017-01-23

## 2017-01-23 RX ORDER — SODIUM CHLORIDE, SODIUM LACTATE, POTASSIUM CHLORIDE, CALCIUM CHLORIDE 600; 310; 30; 20 MG/100ML; MG/100ML; MG/100ML; MG/100ML
100 INJECTION, SOLUTION INTRAVENOUS CONTINUOUS
Status: DISCONTINUED | OUTPATIENT
Start: 2017-01-23 | End: 2017-01-24 | Stop reason: HOSPADM

## 2017-01-23 RX ORDER — POTASSIUM CHLORIDE 750 MG/1
40 TABLET, FILM COATED, EXTENDED RELEASE ORAL
Status: COMPLETED | OUTPATIENT
Start: 2017-01-23 | End: 2017-01-23

## 2017-01-23 RX ORDER — INSULIN LISPRO 100 [IU]/ML
INJECTION, SOLUTION INTRAVENOUS; SUBCUTANEOUS
Status: DISCONTINUED | OUTPATIENT
Start: 2017-01-23 | End: 2017-01-24 | Stop reason: HOSPADM

## 2017-01-23 RX ORDER — INSULIN LISPRO 100 [IU]/ML
10 INJECTION, SOLUTION INTRAVENOUS; SUBCUTANEOUS
Status: DISCONTINUED | OUTPATIENT
Start: 2017-01-23 | End: 2017-01-24 | Stop reason: HOSPADM

## 2017-01-23 RX ADMIN — ASPIRIN 81 MG CHEWABLE TABLET 81 MG: 81 TABLET CHEWABLE at 08:24

## 2017-01-23 RX ADMIN — DIVALPROEX SODIUM 500 MG: 500 TABLET, EXTENDED RELEASE ORAL at 21:12

## 2017-01-23 RX ADMIN — SODIUM PHOSPHATE, MONOBASIC, MONOHYDRATE: 276; 142 INJECTION, SOLUTION INTRAVENOUS at 04:49

## 2017-01-23 RX ADMIN — DEXTROSE MONOHYDRATE AND SODIUM CHLORIDE: 5; .45 INJECTION, SOLUTION INTRAVENOUS at 12:01

## 2017-01-23 RX ADMIN — ZIPRASIDONE HYDROCHLORIDE 80 MG: 40 CAPSULE ORAL at 16:49

## 2017-01-23 RX ADMIN — INSULIN LISPRO 3 UNITS: 100 INJECTION, SOLUTION INTRAVENOUS; SUBCUTANEOUS at 21:12

## 2017-01-23 RX ADMIN — LISINOPRIL 20 MG: 20 TABLET ORAL at 08:24

## 2017-01-23 RX ADMIN — SODIUM CHLORIDE 3.5 UNITS/HR: 900 INJECTION, SOLUTION INTRAVENOUS at 15:37

## 2017-01-23 RX ADMIN — INSULIN LISPRO 4 UNITS: 100 INJECTION, SOLUTION INTRAVENOUS; SUBCUTANEOUS at 13:16

## 2017-01-23 RX ADMIN — INSULIN LISPRO 3 UNITS: 100 INJECTION, SOLUTION INTRAVENOUS; SUBCUTANEOUS at 16:50

## 2017-01-23 RX ADMIN — SODIUM CHLORIDE 1.4 UNITS/HR: 900 INJECTION, SOLUTION INTRAVENOUS at 17:45

## 2017-01-23 RX ADMIN — HYDROCODONE BITARTRATE AND ACETAMINOPHEN 1 TABLET: 5; 325 TABLET ORAL at 00:40

## 2017-01-23 RX ADMIN — POTASSIUM CHLORIDE 40 MEQ: 750 TABLET, FILM COATED, EXTENDED RELEASE ORAL at 13:26

## 2017-01-23 RX ADMIN — SODIUM CHLORIDE 3.7 UNITS/HR: 900 INJECTION, SOLUTION INTRAVENOUS at 16:41

## 2017-01-23 RX ADMIN — SODIUM CHLORIDE 11.9 UNITS/HR: 900 INJECTION, SOLUTION INTRAVENOUS at 10:17

## 2017-01-23 RX ADMIN — Medication 10 ML: at 07:04

## 2017-01-23 RX ADMIN — DIVALPROEX SODIUM 500 MG: 500 TABLET, EXTENDED RELEASE ORAL at 08:24

## 2017-01-23 RX ADMIN — SODIUM CHLORIDE 6.6 UNITS/HR: 900 INJECTION, SOLUTION INTRAVENOUS at 12:18

## 2017-01-23 RX ADMIN — Medication 10 ML: at 13:18

## 2017-01-23 RX ADMIN — ZIPRASIDONE HYDROCHLORIDE 80 MG: 40 CAPSULE ORAL at 08:24

## 2017-01-23 RX ADMIN — DIVALPROEX SODIUM 500 MG: 500 TABLET, EXTENDED RELEASE ORAL at 16:49

## 2017-01-23 RX ADMIN — INSULIN LISPRO 10 UNITS: 100 INJECTION, SOLUTION INTRAVENOUS; SUBCUTANEOUS at 16:49

## 2017-01-23 RX ADMIN — DEXTROSE MONOHYDRATE AND SODIUM CHLORIDE: 5; .45 INJECTION, SOLUTION INTRAVENOUS at 04:48

## 2017-01-23 RX ADMIN — ONDANSETRON 4 MG: 2 INJECTION INTRAMUSCULAR; INTRAVENOUS at 04:45

## 2017-01-23 RX ADMIN — SODIUM CHLORIDE, SODIUM LACTATE, POTASSIUM CHLORIDE, AND CALCIUM CHLORIDE 100 ML/HR: 600; 310; 30; 20 INJECTION, SOLUTION INTRAVENOUS at 18:23

## 2017-01-23 RX ADMIN — MAGNESIUM SULFATE HEPTAHYDRATE 2 G: 40 INJECTION, SOLUTION INTRAVENOUS at 04:48

## 2017-01-23 RX ADMIN — ENOXAPARIN SODIUM 40 MG: 40 INJECTION SUBCUTANEOUS at 21:13

## 2017-01-23 RX ADMIN — HUMAN INSULIN 40 UNITS: 100 INJECTION, SUSPENSION SUBCUTANEOUS at 16:49

## 2017-01-23 NOTE — H&P
[address] [facility] [place] [address]   HISTORY AND PHYSICAL       Name:  Barney Fields   MR#:  [id number]   :  1965   Account #:  [de-identified]        Date of Adm:  2017       PRIMARY CARE PHYSICIAN: Linsey Brewer MD    SOURCE OF INFORMATION: The patient. CHIEF COMPLAINT: Nausea and vomiting. HISTORY OF PRESENT ILLNESS: This is a 70-year-old man with a   past medical history significant for hypertension, type 1 diabetes,   dyslipidemia, bipolar disorder, who was in his usual state of health until   the day of presentation at the emergency room when the patient   developed nausea and vomiting. The nausea and vomiting is   persistent, progressive and getting worse. It was associated with   abdominal pain, which the patient described as cramping abdominal   pain 6/10 in severity. No relieving or aggravating factors. The patient   stated that he ran out of his long-acting insulin for the past 3 days. There was a miscommunication between his endocrinologist's office   and the pharmacy store. He was told by his endocrinologist that the   prescription was sent to the pharmacy. The pharmacy did not receive   the prescription. Because of that the patient was without long-acting   coverage for his type 1 diabetes for about 4 days. The patient stated   that he has been drinking a lot of water, and he also has been having   excessive urination. He has a headache and a mild nonproductive   cough. The patient has no fever, no rigors and no chills. When the   patient arrived in the emergency room, his lab work showed that he is   in DKA. The patient has been in DKA in the past because of   noncompliance with treatment. He was started on insulin therapy and   was referred to the hospitalist service for evaluation for admission. PAST MEDICAL HISTORY: Hypertension, type 1 diabetes, bipolar   disorder, dyslipidemia.     ALLERGIES: NO KNOWN DRUG ALLERGIES. MEDICATIONS   1. Aspirin 81 mg daily. 2. Depakote 500 mg 3 times daily. 3. NPH insulin 40 units subcutaneously twice daily. 4. Lisinopril 20 mg daily. 5. Geodon 80 mg twice daily. FAMILY HISTORY: This was reviewed. It is not pertinent. PAST SURGICAL HISTORY: This is significant for appendectomy. SOCIAL HISTORY: The patient smokes about 1 pack of cigarettes   daily. Denies alcohol abuse. The patient also admits to use of illicit   drugs. REVIEW OF SYSTEMS   HEAD, EYES, EARS, NOSE AND THROAT: This is positive for   headache, no dizziness, no blurring of vision, no photophobia. RESPIRATORY SYSTEM: This is positive for cough, no shortness of   breath, no hemoptysis. CARDIOVASCULAR: No chest pain, no orthopnea, no palpitation. GASTROINTESTINAL: This is positive for nausea and vomiting. No   diarrhea, no constipation. GENITOURINARY: No dysuria, no urgency, and no frequency. All   other systems are reviewed and they are negative. PHYSICAL EXAMINATION   GENERAL APPEARANCE: The patient appeared ill, in moderate   distress. VITAL SIGNS: On arrival at the emergency room, temperature 97.8,   pulse 142, respiratory rate 14, blood pressure 133/84, oxygen   saturation 96% on room air. HEAD: Normocephalic, atraumatic. EYES: Normal eye movements. No redness, no drainage, no   discharge. EARS:  Normal external ears with no obvious drainage. NOSE:  No deformity, no drainage. MOUTH AND THROAT: Dry oral mucosa. No visible oral lesions. NECK: Supple. No JVD. No thyromegaly. CHEST: Clear breath sounds. No wheezing, no crackles. HEART: Normal S1 and S2, regular. No clinically appreciable murmur. ABDOMEN: Soft, nontender, normal bowel sounds. CENTRAL NERVOUS SYSTEM:  Alert, oriented x3. No gross focal   neurological deficits. EXTREMITIES: No edema. Pulses 2+ bilaterally. MUSCULOSKELETAL: No obvious joint deformity or swelling.    SKIN: No active skin lesions seen in the exposed parts of the body. PSYCHIATRIC: Normal mood but flat affect. LYMPHATIC: No cervical lymphadenopathy. DIAGNOSTIC DATA: None. LABORATORY DATA: Chemistry:  Sodium 123, potassium 4.9,   chloride 88, CO2 10, glucose 438, BUN 24, creatinine 1.38, calcium   8.6, bilirubin total 0.9, ALT 23, AST 15, alkaline phosphatase 87, total   protein 7.3, albumin level 4.1, globulin 3.2. Magnesium 1.7. Hematology: WBC 16.0, hemoglobin 16.2, hematocrit 46.2, platelets at   894. ASSESSMENT   1. Diabetic ketoacidosis. 2. Hypertension. 3. Hyponatremia. 4. Dehydration. 5. Leukocytosis. 6. Tachycardia. 7. Bipolar disorder. 8. Tobacco abuse. PLAN   1. Diabetic ketoacidosis. We will admit the patient for further evaluation   and treatment. We will continue with insulin therapy started in the   emergency room. The diabetic ketoacidosis is most likely as a result of   nonadherence to medical treatment. 2. Hypertension. We will resume preadmission medication. We will   monitor the patient's blood pressure closely. 3. Hyponatremia. This is most likely due to the hyperglycemia. We will   carry out hydration with normal saline. 4. Dehydration. This is due to the diabetic ketoacidosis. We will carry   out hydration with normal saline. 5. Leukocytosis. No source of infection identified yet. We will obtain a   chest x-ray, urinalysis, amylase and lipase level. 6. Tachycardia. This is most likely related to the diabetic ketoacidosis. Will check a TSH level. We will also check cardiac markers. 7. Bipolar disorder. We will resume preadmission medication. 8. Tobacco abuse. The patient advised to quit smoking. He does not   want to be placed on Nicoderm patch. 9. OTHER ISSUES, CODE STATUS: THE PATIENT IS A FULL   CODE.   10. We will place the patient on Lovenox for deep vein thrombosis    prophylaxis.         Charline Waters MD      RE / MABEL   D:  01/22/2017   17:43   T:  01/22/2017   22:23   Job #: [id number]

## 2017-01-23 NOTE — PROGRESS NOTES
Attended Coronary Care Unit Rounds at [address] [place] [id number] [facility] ([id number])

## 2017-01-23 NOTE — PROGRESS NOTES
attempted to see patient this am and he was resting. I did come back to see him and he was sleeping and did arouse when I tapped him. He did mention that he had trouble getting his long acting humulin insulin at the end of last week and somehow the prescription never got called in. I was unable to complete assessment as patient closed his eyes.   I will follow up in am.

## 2017-01-23 NOTE — PROGRESS NOTES
Claire Costello MD   Phone/text: [phone] (7am to 7pm)  After 7pm please call  for hospitalist on call    Hospitalist Progress Note     1/23/2017   PCP:  Dr. Tonja Gonzalez MD  Chief Complaint   Patient presents with    High Blood Sugar       Admission Summary: This is a 24-year-old man with a past medical history significant for hypertension, type 1 diabetes, dyslipidemia, bipolar disorder, who was in his usual state of health until the day of presentation at the emergency room when the patient developed nausea and vomiting. Reason For Visit:  DKA, HTN    Assessment/Plan   DKA with type 1 DM (POA) - due to missed insulin from miscommunication with pharmacy  - A1c 12.5  - Monitor gap, lytes  - Continue fluids with D5  - Continue insulin drip    Tachycardia (POA) - likely being driven by nausea/vomiting/dehydration, improving  - Continue IV fluids  - TSH wnl    Nausea/vomiting (POA) - likely due to DKA  - Antiemetics as needed  - Consider abdominal imaging if persists    Metabolic acidosis (POA) - combination gap and non-gap acidosis  - Fluids with bicarbonate    Hyponatremia (POA) - some due to dehydration and some due to pseudohyponatremia from hyperglycemia, improving  - Continue hypertonic saline    Leukocytosis (POA) - likely reactive  - UA 1/22 unremarkable  - CXR 1/22 unremarkable  - Monitor and consider abdominal imaging     WAQAR (POA) - due to dehydration from DKA, nausea, vomiting. Resolved  - Continue IV fluids    Bipolar disorder (chronic) - stable  - Depakote levels checked  - TSH wnl  - Continue depakote, geodon     See orders for other plans. VTE prophylaxis: enoxaparin  Discussed plan of care with Patient/Family and Nurse   Pre-admission lived at home  Discharge plan: to home  Estimated time to discharge: 1-2 days     Subjective   Mr. Demetra Cottrell was still nauseated this morning but this has improved with zofran. No abdominal pain, SOB.  He does have a small headache. Reviewed interval history    Physical examination     Visit Vitals    /66 (BP 1 Location: Left arm, BP Patient Position: At rest)    Pulse (!) 107    Temp 98 °F (36.7 °C)    Resp 20    Ht 5' 9\" (1.753 m)    Wt 71.4 kg (157 lb 6.5 oz)    SpO2 98%    BMI 23.25 kg/m2       Temp (24hrs), Av.2 °F (36.8 °C), Min:97.8 °F (36.6 °C), Max:98.8 °F (37.1 °C)      Intake/Output Summary (Last 24 hours) at 17 0839  Last data filed at 17 0600   Gross per 24 hour   Intake           538.66 ml   Output              700 ml   Net          -161.34 ml       Gen - NAD  ENT - MM dry  Neck - supple, full ROM  CV - Tachycardic, regular rhythm, no MRG  Resp - lungs CTA b/l, normal WOB  GI - abdomen S, NT, ND, +BS. No hepatosplenomegaly  Ext - no edema  Neuro - A&O, no focal deficits    Data Review     Telemetry x   ECG    Xray x   CT scan    MRI    Echocardiogram          I have reviewed the flow sheet and recent notes  New labs and data below personally reviewed.     Recent Labs      17   0304  17   1634   WBC  15.7*  16.0*   HGB  14.3  16.2   HCT  41.0  46.2   PLT  301  359     Recent Labs      17   0703  17   0304  17   1634   NA  129*  124*  123*   K  4.0  4.8  4.9   CL  100  97  88*   CO2  11*  6*  10*   GLU  230*  261*  438*   BUN  12  14  24*   CREA  0.86  0.87  1.35*   CA  7.3*  7.5*  8.7   MG  2.3  1.5*  1.7   PHOS  2.5*  1.7*   --    ALB   --   3.4*  4.1   TBILI   --   0.6  0.9   SGOT   --   14*  15   ALT   --   18  23       Medications reviewed  Current Facility-Administered Medications   Medication Dose Route Frequency    sodium phosphate 30 mmol in 0.9% sodium chloride 250 mL infusion   IntraVENous ONCE    sodium bicarbonate (8.4%) 150 mEq in dextrose 5 % - 0.45% NaCl 1,000 mL infusion   IntraVENous CONTINUOUS    insulin regular (NOVOLIN R, HUMULIN R) 100 Units in 0.9% sodium chloride 100 mL infusion  0-50 Units/hr IntraVENous TITRATE    insulin lispro (HUMALOG) injection   SubCUTAneous TIDAC    glucose chewable tablet 16 g  4 Tab Oral PRN    dextrose (D50W) injection syrg 12.5-25 g  12.5-25 g IntraVENous PRN    glucagon (GLUCAGEN) injection 1 mg  1 mg IntraMUSCular PRN    aspirin chewable tablet 81 mg  81 mg Oral DAILY    divalproex ER (DEPAKOTE ER) 24 hour tablet 500 mg  500 mg Oral TID    lisinopril (PRINIVIL, ZESTRIL) tablet 20 mg  20 mg Oral DAILY    ziprasidone (GEODON) capsule 80 mg  80 mg Oral BID WITH MEALS    sodium chloride (NS) flush 5-10 mL  5-10 mL IntraVENous Q8H    sodium chloride (NS) flush 5-10 mL  5-10 mL IntraVENous PRN    acetaminophen (TYLENOL) tablet 650 mg  650 mg Oral Q4H PRN    HYDROcodone-acetaminophen (NORCO) 5-325 mg per tablet 1 Tab  1 Tab Oral Q4H PRN    HYDROmorphone (PF) (DILAUDID) injection 1 mg  1 mg IntraVENous Q4H PRN    ondansetron (ZOFRAN) injection 4 mg  4 mg IntraVENous Q4H PRN    enoxaparin (LOVENOX) injection 40 mg  40 mg SubCUTAneous Q24H         Fallon Kwan MD  Internal Medicine  1/23/2017

## 2017-01-23 NOTE — ROUTINE PROCESS
08:00 SBAR from Rafiq Jones RN and Fela Aranda RN performed a dual skin assessment on this patient No impairment noted  Jermaine score is 11    10:00 Sleeps at intervals, remains on insulin gtt and NaHCO3 gtt, labs due at noon. 14:00 Labs called to Dr. Compa Norwood, see orders    15:35 Bedside shift change report given to Angelina Vázquez (oncoming nurse) by Hector Gasca (offgoing nurse). Report included the following information SBAR, Kardex, Procedure Summary, Intake/Output, MAR, Accordion, Recent Results, Med Rec Status, Cardiac Rhythm ST and Alarm Parameters .

## 2017-01-23 NOTE — INTERDISCIPLINARY ROUNDS
IDR/SLIDR Summary          Patient: Joanne Deleon MRN: [id number]    Age: 46 y.o. YOB: 1965 Room/Bed: [address] [place] [address]   Admit Diagnosis: DKA (diabetic ketoacidoses) ([facility].)  Principal Diagnosis: DKA (diabetic ketoacidoses) ([facility].)   Goals: Close gap, manage glucose  Readmission: NO  Quality Measure: Not applicable  VTE Prophylaxis: Chemical  Influenza Vaccine screening completed? YES  Pneumococcal Vaccine screening completed? NO  Mobility needs: No   Nutrition plan:Yes  Consults:Case Management    Financial concerns:Yes  Escalated to CM? NO  RRAT Score: 11   [facility]   Testing due for pt today? YES  LOS: 1 days Expected length of stay 2 days  Discharge plan:  To home   PCP: Xuan Soria MD  Transportation needs: No    Days before discharge:one day until discharge   Discharge disposition: Home    Signed:     Viktor Nair RN  1/23/2017  7:23 AM

## 2017-01-23 NOTE — PROGRESS NOTES
1930 Bedside shift change report given to Kana Castro (oncoming nurse) by Sammy Ricardo (offgoing nurse). Report included the following information SBAR, Procedure Summary, Intake/Output, MAR, Recent Results and Cardiac Rhythm ST     2000 Attempting to start new IV. Labs drawn and sent. 2145 Switched to D5. Pt at goal blood glucose before saline boluses complete. 0730 Bedside shift change report given to Hazel(oncoming nurse) by Kana Castro (offgoing nurse).  Report included the following information SBAR, Kardex, Procedure Summary, Intake/Output, MAR, Recent Results and Cardiac Rhythm ST.

## 2017-01-24 VITALS
TEMPERATURE: 98.6 F | HEIGHT: 69 IN | SYSTOLIC BLOOD PRESSURE: 121 MMHG | DIASTOLIC BLOOD PRESSURE: 70 MMHG | OXYGEN SATURATION: 95 % | WEIGHT: 169.75 LBS | BODY MASS INDEX: 25.14 KG/M2 | RESPIRATION RATE: 20 BRPM | HEART RATE: 72 BPM

## 2017-01-24 LAB
ANION GAP BLD CALC-SCNC: 7 MMOL/L (ref 5–15)
BUN SERPL-MCNC: 6 MG/DL (ref 6–20)
BUN/CREAT SERPL: 11 (ref 12–20)
CALCIUM SERPL-MCNC: 7.6 MG/DL (ref 8.5–10.1)
CHLORIDE SERPL-SCNC: 103 MMOL/L (ref 97–108)
CO2 SERPL-SCNC: 28 MMOL/L (ref 21–32)
CREAT SERPL-MCNC: 0.54 MG/DL (ref 0.7–1.3)
ERYTHROCYTE [DISTWIDTH] IN BLOOD BY AUTOMATED COUNT: 12.4 % (ref 11.5–14.5)
GLUCOSE BLD STRIP.AUTO-MCNC: 189 MG/DL (ref 65–100)
GLUCOSE SERPL-MCNC: 172 MG/DL (ref 65–100)
HCT VFR BLD AUTO: 34.9 % (ref 36.6–50.3)
HGB BLD-MCNC: 12.5 G/DL (ref 12.1–17)
MAGNESIUM SERPL-MCNC: 2.1 MG/DL (ref 1.6–2.4)
MCH RBC QN AUTO: 31.4 PG (ref 26–34)
MCHC RBC AUTO-ENTMCNC: 35.8 G/DL (ref 30–36.5)
MCV RBC AUTO: 87.7 FL (ref 80–99)
PHOSPHATE SERPL-MCNC: 1.2 MG/DL (ref 2.6–4.7)
PLATELET # BLD AUTO: 218 K/UL (ref 150–400)
POTASSIUM SERPL-SCNC: 3.2 MMOL/L (ref 3.5–5.1)
RBC # BLD AUTO: 3.98 M/UL (ref 4.1–5.7)
SERVICE CMNT-IMP: ABNORMAL
SODIUM SERPL-SCNC: 138 MMOL/L (ref 136–145)
WBC # BLD AUTO: 10.3 K/UL (ref 4.1–11.1)

## 2017-01-24 PROCEDURE — 74011250636 HC RX REV CODE- 250/636: Performed by: INTERNAL MEDICINE

## 2017-01-24 PROCEDURE — 82962 GLUCOSE BLOOD TEST: CPT

## 2017-01-24 PROCEDURE — 74011250637 HC RX REV CODE- 250/637: Performed by: INTERNAL MEDICINE

## 2017-01-24 PROCEDURE — 84100 ASSAY OF PHOSPHORUS: CPT | Performed by: HOSPITALIST

## 2017-01-24 PROCEDURE — 74011636637 HC RX REV CODE- 636/637: Performed by: HOSPITALIST

## 2017-01-24 PROCEDURE — 80048 BASIC METABOLIC PNL TOTAL CA: CPT | Performed by: HOSPITALIST

## 2017-01-24 PROCEDURE — 85027 COMPLETE CBC AUTOMATED: CPT | Performed by: HOSPITALIST

## 2017-01-24 PROCEDURE — 36415 COLL VENOUS BLD VENIPUNCTURE: CPT | Performed by: HOSPITALIST

## 2017-01-24 PROCEDURE — 74011000250 HC RX REV CODE- 250: Performed by: INTERNAL MEDICINE

## 2017-01-24 PROCEDURE — 83735 ASSAY OF MAGNESIUM: CPT | Performed by: HOSPITALIST

## 2017-01-24 PROCEDURE — 74011250637 HC RX REV CODE- 250/637: Performed by: HOSPITALIST

## 2017-01-24 RX ORDER — SODIUM,POTASSIUM PHOSPHATES 280-250MG
1 POWDER IN PACKET (EA) ORAL
Status: DISCONTINUED | OUTPATIENT
Start: 2017-01-24 | End: 2017-01-24 | Stop reason: HOSPADM

## 2017-01-24 RX ORDER — POTASSIUM CHLORIDE 750 MG/1
40 TABLET, FILM COATED, EXTENDED RELEASE ORAL
Status: COMPLETED | OUTPATIENT
Start: 2017-01-24 | End: 2017-01-24

## 2017-01-24 RX ADMIN — HUMAN INSULIN 40 UNITS: 100 INJECTION, SUSPENSION SUBCUTANEOUS at 06:24

## 2017-01-24 RX ADMIN — ZIPRASIDONE HYDROCHLORIDE 80 MG: 40 CAPSULE ORAL at 08:49

## 2017-01-24 RX ADMIN — SODIUM CHLORIDE: 900 INJECTION, SOLUTION INTRAVENOUS at 06:11

## 2017-01-24 RX ADMIN — DIVALPROEX SODIUM 500 MG: 500 TABLET, EXTENDED RELEASE ORAL at 08:49

## 2017-01-24 RX ADMIN — LISINOPRIL 20 MG: 20 TABLET ORAL at 08:50

## 2017-01-24 RX ADMIN — POTASSIUM CHLORIDE 40 MEQ: 750 TABLET, FILM COATED, EXTENDED RELEASE ORAL at 06:10

## 2017-01-24 RX ADMIN — Medication 10 ML: at 08:52

## 2017-01-24 RX ADMIN — ASPIRIN 81 MG CHEWABLE TABLET 81 MG: 81 TABLET CHEWABLE at 08:49

## 2017-01-24 RX ADMIN — POTASSIUM & SODIUM PHOSPHATES POWDER PACK 280-160-250 MG 1 PACKET: 280-160-250 PACK at 08:50

## 2017-01-24 RX ADMIN — INSULIN LISPRO 10 UNITS: 100 INJECTION, SOLUTION INTRAVENOUS; SUBCUTANEOUS at 06:23

## 2017-01-24 RX ADMIN — INSULIN LISPRO 3 UNITS: 100 INJECTION, SOLUTION INTRAVENOUS; SUBCUTANEOUS at 06:23

## 2017-01-24 NOTE — DISCHARGE SUMMARY
Discharge Summary     Patient:  Jessica Spaulding       MRN: [id number]       YOB: 1965       Age: 46 y.o. Date of admission:  1/22/2017    Date of discharge:  1/24/2017    Primary care provider: Dr. Derek Turner MD    Admitting provider:  Yu Cartagena MD    Discharging provider:  Sergio Aquino 91.: [phone]. If unavailable, call [phone] and ask the  to page the triage hospitalist.    Consultations  IP CONSULT TO HOSPITALIST    Procedures  * No surgery found *    Discharge destination: Home. The patient is stable for discharge. Admission diagnosis  DKA [facility]diabetic ketoacidoses) ([facility]    Current Discharge Medication List      START taking these medications    Details   phosphorus (K PHOS NEUTRAL) 250 mg tablet Take 1 Tab by mouth three (3) times daily (with meals) for 9 doses. Indications: HYPOPHOSPHATEMIA  Qty: 9 Tab, Refills: 0         CONTINUE these medications which have NOT CHANGED    Details   insulin lispro (HUMALOG) 100 unit/mL injection Take 10 units , subcutaneously , prior to breakfast, lunch and dinner. IF your blood sugar >200 take 20 units subcutaneously, IF your blood sugar is >300 take 30 units subcutaneously. IF your blood sugar is >400 call your physician or come to ER. Qty: 1 Vial, Refills: 6      divalproex ER (DEPAKOTE ER) 500 mg ER tablet Take 500 mg by mouth three (3) times daily. lisinopril (PRINIVIL, ZESTRIL) 20 mg tablet Take 20 mg by mouth daily. ziprasidone (GEODON) 80 mg capsule Take 80 mg by mouth two (2) times daily (with meals). insulin NPH (NOVOLIN N, HUMULIN N) 100 unit/mL injection 40 Units by SubCUTAneous route ACB/HS. Indications: DIABETES MELLITUS      aspirin 81 mg chewable tablet Take 81 mg by mouth daily.              Follow-up Information     Follow up With Details Comments [address] Silvia Hardin MD Go on 2/2/2017 Appointment at 3:00pm, For follow up after hospitalization [address] [facility] [email or web address]   [phone]            Final discharge diagnoses and brief hospital course  Please also refer to the admission H&P for details on the presenting problem. This is a 24-year-old man with a past medical history significant for hypertension, type 1 diabetes, dyslipidemia, bipolar disorder, who was in his usual state of health until the day of presentation at the emergency room when the patient developed nausea and vomiting.     DKA with type 1 DM (POA) - due to missed insulin from miscommunication with pharmacy. I have verified that his insulin is at [address]  - A1c 12.5  - Monitor gap, lytes  - Treated with insulin drip and now back on home regimen     Tachycardia (POA) - likely being driven by nausea/vomiting/dehydration, resolved  - Continue IV fluids  - TSH wnl     Nausea/vomiting (POA) - likely due to DKA. Resolved  - Antiemetics as needed  - Consider abdominal imaging if persists     Metabolic acidosis (POA) - combination gap and non-gap acidosis. Resolved  - Treated with fluids with bicarbonate     Hyponatremia (POA) - some due to dehydration and some due to pseudohyponatremia from hyperglycemia, resolved  - Treated with hypertonic saline     Leukocytosis (POA) - likely reactive. Resolved  - UA 1/22 unremarkable  - CXR 1/22 unremarkable  - Monitor and consider abdominal imaging      WAQAR (POA) - due to dehydration from DKA, nausea, vomiting.  Resolved  - Continue IV fluids     Bipolar disorder (chronic) - stable  - Depakote levels checked  - TSH wnl  - Continue depakote, geodon     FOLLOW-UP CARE RECOMMENDATIONS:    FOLLOW-UP TESTS recommended: Basic metabolic panel and phosphorus level in 1 week with your primary care doctor    SYMPTOMS to watch for: nausea, vomiting, change in mentation, falling, weakness, sweating, low blood sugar.    DIET/what to eat:  Diabetic Diet    ACTIVITY:  Activity as tolerated    What to do if new or unexpected symptoms occur? If you experience any of the above symptoms (or should other concerns or questions arise after discharge) please call your primary care physician. Return to the emergency room if you cannot get hold of your doctor. · It is very important that you keep your follow-up appointment(s). · Please bring discharge papers, medication list (and/or medication bottles) to your follow-up appointments for review by your outpatient provider(s). · Please check the list of medications and be sure it includes every medication (even non-prescription medications) that your provider wants you to take. · It is important that you take the medication exactly as they are prescribed. · Keep your medication in the bottles provided by the pharmacist and keep a list of the medication names, dosages, and times to be taken in your wallet. · Do not take other medications without consulting your doctor. · If you have any questions about your medications or other instructions, please talk to your nurse or care provider before you leave the hospital.      Physical examination at discharge  Visit Vitals    /70 (BP 1 Location: Right arm, BP Patient Position: At rest)    Pulse 72    Temp 98.6 °F (37 °C)    Resp 20    Ht 5' 9\" (1.753 m)    Wt 77 kg (169 lb 12.1 oz)    SpO2 95%    BMI 25.07 kg/m2       Gen - NAD  ENT - MMM  Neck - supple, full ROM  CV - RRR, no MRG  Resp - lungs CTA b/l, normal WOB  GI - abdomen S, NT, ND, +BS. No hepatosplenomegaly  Ext - no edema  Neuro - A&O, no focal deficits    Pertinent imaging studies:    CXR 1/22/16  FINDINGS:   A portable AP radiograph of the chest was obtained at 1950 hours. Lines and tubes: The patient is on a cardiac monitor. Lungs: The lungs are clear. Pleura: There is no pneumothorax or pleural effusion.   Mediastinum: The cardiac and mediastinal contours and pulmonary vascularity are  normal.  Bones and soft tissues: The bones and soft tissues are grossly within normal  limits. IMPRESSION: No airspace disease or other acute abnormality. Recent Labs      01/24/17   0337  01/23/17   0304  01/22/17   1634   WBC  10.3  15.7*  16.0*   HGB  12.5  14.3  16.2   HCT  34.9*  41.0  46.2   PLT  218  301  359     Recent Labs      01/24/17   0337  01/23/17   1125  01/23/17   0703  01/23/17   0304   NA  138  131*  129*  124*   K  3.2*  3.3*  4.0  4.8   CL  103  101  100  97   CO2  28  21  11*  6*   BUN  6  10  12  14   CREA  0.54*  0.84  0.86  0.87   GLU  172*  268*  230*  261*   CA  7.6*  7.4*  7.3*  7.5*   MG  2.1  2.1  2.3  1.5*   PHOS  1.2*   --   2.5*  1.7*     Recent Labs      01/23/17   0304  01/22/17   1634   SGOT  14*  15   AP  68  87   TP  6.1*  7.3   ALB  3.4*  4.1   GLOB  2.7  3.2   AML   --   31   LPSE   --   58*     No results for input(s): INR, PTP, APTT in the last 72 hours. No lab exists for component: INREXT, INREXT   No results for input(s): FE, TIBC, PSAT, FERR in the last 72 hours. No results for input(s): PH, PCO2, PO2 in the last 72 hours.   Recent Labs      01/23/17   0304  01/22/17   2111   CPK  72  91   CKMB  <1.0  1.0     No components found for: GLPOC    Chronic Diagnoses:    Problem List as of 1/24/2017  Date Reviewed: 1/22/2017          Codes Class Noted - Resolved    DKA (diabetic ketoacidoses) ([facility] ICD-10-CM: E13.10  ICD-9-CM: 250.10  1/22/2017 - Present        C. difficile diarrhea ICD-10-CM: A04.7  ICD-9-CM: 008.45  3/20/2016 - Present        Hyperglycemia due to type 2 diabetes mellitus ([facility] ICD-10-CM: E11.65  ICD-9-CM: 250.00  9/21/2015 - Present        Hyponatremia ICD-10-CM: E87.1  ICD-9-CM: 276.1  9/21/2015 - Present        Nicotine dependence ICD-10-CM: F17.200  ICD-9-CM: 305.1  9/21/2015 - Present        Polysubstance abuse ICD-10-CM: F19.10  ICD-9-CM: 305.90  9/21/2015 - Present        Hypertension ICD-10-CM: I10  ICD-9-CM: 401.9  9/21/2015 - Present        Depressive disorder ICD-10-CM: F32.9  ICD-9-CM: 555  9/21/2015 - Present        Bipolar 1 disorder ([place] [address].) ICD-10-CM: F31.9  ICD-9-CM: 296.7  9/21/2015 - Present        IDDM (insulin dependent diabetes mellitus) ([place] [address].) (Chronic) ICD-10-CM: E11.9, Z79.4  ICD-9-CM: 250.00, V58.67  12/3/2014 - Present        Abscess and cellulitis ICD-10-CM: L03.90, L02.91  ICD-9-CM: 682.9  11/30/2014 - Present        RESOLVED: DKA (diabetic ketoacidoses) ([facility] ICD-10-CM: E13.10  ICD-9-CM: 250.10  1/14/2015 - 3/22/2016        * (Principal)RESOLVED: DKA (diabetic ketoacidoses) ([facility].) ICD-10-CM: E13.10  ICD-9-CM: 250.10  11/19/2013 - 6/21/2014              Time spent on discharge related activities today greater than 30 minutes.       Signed:  Tomeka Bell MD                 Hospitalist, Internal Medicine      Cc: Anthony Hernandez MD

## 2017-01-24 NOTE — DISCHARGE INSTRUCTIONS
Please bring this form with you to show your primary care provider at your follow-up appointment. Primary care provider:  Dr. Tracey Esquivel MD    Discharging provider:  Izzy Wright MD    You have been admitted to the hospital with the following diagnoses:  DKA (diabetic ketoacidoses) [facility])    FOLLOW-UP CARE RECOMMENDATIONS:    APPOINTMENTS:  Follow-up Information     Follow up With Details Comments Contact Info    Tracey Esquivel MD Go on 2/2/2017 Appointment at 3:00pm, For follow up after hospitalization [id number]  [phone]             FOLLOW-UP TESTS recommended: Basic metabolic panel and phosphorus level in 1 week with your primary care doctor    SYMPTOMS to watch for: nausea, vomiting, change in mentation, falling, weakness, sweating, low blood sugar. DIET/what to eat:  Diabetic Diet    ACTIVITY:  Activity as tolerated    What to do if new or unexpected symptoms occur? If you experience any of the above symptoms (or should other concerns or questions arise after discharge) please call your primary care physician. Return to the emergency room if you cannot get hold of your doctor. · It is very important that you keep your follow-up appointment(s). · Please bring discharge papers, medication list (and/or medication bottles) to your follow-up appointments for review by your outpatient provider(s). · Please check the list of medications and be sure it includes every medication (even non-prescription medications) that your provider wants you to take. · It is important that you take the medication exactly as they are prescribed. · Keep your medication in the bottles provided by the pharmacist and keep a list of the medication names, dosages, and times to be taken in your wallet. · Do not take other medications without consulting your doctor.    · If you have any questions about your medications or other instructions, please talk to your nurse or care provider before you leave the hospital.    I understand that if any problems occur once I am at home I am to contact my physician. These instructions were explained to me and I had the opportunity to ask questions.

## 2017-01-24 NOTE — DIABETES MGMT
DTC Consult Note    Recommendations/ Comments: Admitted in DKA due to not having NPH insulin secondary to miscommunication with the MD office and pharmacy. , gap 25 on admission; resolved with insuin gtt. Transitioned off gtt yesterday.  today. Awaiting d/c. Consult received for:  [x]             Assessment of home management                []      Medication Recommendations                []             Meter/monitoring     []             Insulin instruction     []             New diagnosis     []             Outpatient education     []             Insulin pump patient     []             Insulin infusion     [x]             DKA/HHS    Chart reviewed and initial evaluation complete on Gabydillon [place]. Patient is a 46 y.o. male with Type 1 DM on NPH insulin 40 units BID and Humalog 10-20 units AC at home. BG monitoring at home 3x/day times per day. Patient reports BG levels at home vary  Assessed and instructed patient on the following:   ·  interpretation of lab results A1c elevated 12.5%. Reviewed goal of 7%   · blood sugar goals,   · complications of diabetes mellitus,   · hypoglycemia prevention and treatment,   · exercise,   · DKA causes, symptoms, actions to take  · illness management,   · SMBG skills and   · nutrition    Encouraged the following:   · Test BG's 3x/day  · Report elevated results to Endocrinologist    Provided patient with the following: [x]             Survival skills education materials               []             Insulin education materials               []             CHO counting education materials               [x]             Outpatient DTC contact number               []             Glucometer                 Discussed with patient and/or family need for follow up appointment for diabetes management after discharge.    He sees Dr. Stu Gibson in Feb 2017     A1c:   Lab Results   Component Value Date/Time    Hemoglobin A1c 12.5 01/23/2017 02:44 AM       Recent Glucose Results: Lab Results   Component Value Date/Time     (H) 01/24/2017 03:37 AM     (H) 01/23/2017 11:25 AM    GLUCPOC 189 (H) 01/24/2017 06:16 AM    GLUCPOC 173 (H) 01/23/2017 09:02 PM    GLUCPOC 107 (H) 01/23/2017 05:44 PM        Lab Results   Component Value Date/Time    Creatinine 0.54 01/24/2017 03:37 AM       Active Orders   Diet    DIET DIABETIC CONSISTENT CARB Regular        PO intake: Patient Vitals for the past 72 hrs:   % Diet Eaten   01/24/17 1010 100 %   01/23/17 1824 100 %   01/23/17 1400 100 %   01/23/17 1017 100 %       Current hospital DM medication: NPH 40 units BID, lispro 10 units AC and lispro resistant correction scale    Will continue to follow as needed. Thank you.   Jerome Lewis RN, CDE

## 2017-01-24 NOTE — PROGRESS NOTES
Bedside shift change report given to Tiana Sanchez RN (oncoming nurse) by Darian Good RN (offgoing nurse). Report included the following information SBAR, Kardex, Procedure Summary, Intake/Output, MAR, Accordion, Recent Results, Med Rec Status and Cardiac Rhythm NSR. Patient to discharge via personal vehicle. No acute distress and VSS.

## 2017-01-24 NOTE — PROGRESS NOTES
I have reviewed discharge instructions with the patient. The patient verbalized understanding. Opportunity for questions given. RN to removed patient PIV and telemetry monitoring once IV infusion complete. Medication education given and reviewed with patient regarding new medications at discharge. Follow up appt made with endocrinology.

## 2017-01-24 NOTE — PROGRESS NOTES
[address] not cleaned when report attempted. 1934 TRANSFER - IN REPORT:    Verbal report received from [facility] RN(name) on Jeannie Gardner  being received from CCU(unit) for routine progression of care      Report consisted of patients Situation, Background, Assessment and   Recommendations(SBAR). Information from the following report(s) SBAR, Kardex, ED Summary, Procedure Summary, Intake/Output, MAR, Med Rec Status and Cardiac Rhythm NSR was reviewed with the receiving nurse. Opportunity for questions and clarification was provided. Assessment completed upon patients arrival to unit and care assumed. 1935 Bedside shift change report given to Gianna Bell (oncoming nurse) by Judit Casey RN (offgoing nurse). Report included the following information SBAR, Kardex, ED Summary, Procedure Summary, Intake/Output, MAR, Recent Results and Cardiac Rhythm NSR.

## 2017-01-24 NOTE — ROUTINE PROCESS
Primary Nurse Umang Drummond RN and Maia Mann RN performed a dual skin assessment on this patient No impairment noted  Jermaine score is 18

## 2017-05-18 ENCOUNTER — APPOINTMENT (OUTPATIENT)
Dept: GENERAL RADIOLOGY | Age: 52
DRG: 638 | End: 2017-05-18
Attending: EMERGENCY MEDICINE
Payer: MEDICARE

## 2017-05-18 ENCOUNTER — HOSPITAL ENCOUNTER (INPATIENT)
Age: 52
LOS: 3 days | Discharge: LEFT BEFORE DISCHARGE | DRG: 638 | End: 2017-05-21
Attending: EMERGENCY MEDICINE | Admitting: FAMILY MEDICINE
Payer: MEDICARE

## 2017-05-18 DIAGNOSIS — E10.10 DIABETIC KETOACIDOSIS WITHOUT COMA ASSOCIATED WITH TYPE 1 DIABETES MELLITUS (HCC): Primary | ICD-10-CM

## 2017-05-18 LAB
ADMINISTERED INITIALS, ADMINIT: NORMAL
ALBUMIN SERPL BCP-MCNC: 4.1 G/DL (ref 3.5–5)
ALBUMIN/GLOB SERPL: 1.2 {RATIO} (ref 1.1–2.2)
ALP SERPL-CCNC: 83 U/L (ref 45–117)
ALT SERPL-CCNC: 22 U/L (ref 12–78)
ANION GAP BLD CALC-SCNC: 20 MMOL/L (ref 5–15)
ANION GAP BLD CALC-SCNC: 25 MMOL/L (ref 5–15)
ANION GAP BLD CALC-SCNC: 7 MMOL/L (ref 5–15)
APPEARANCE UR: CLEAR
ARTERIAL PATENCY WRIST A: YES
AST SERPL W P-5'-P-CCNC: 13 U/L (ref 15–37)
BACTERIA URNS QL MICRO: NEGATIVE /HPF
BASE DEFICIT BLD-SCNC: 8 MMOL/L
BASOPHILS # BLD AUTO: 0 K/UL (ref 0–0.1)
BASOPHILS # BLD AUTO: 0 K/UL (ref 0–0.1)
BASOPHILS # BLD: 0 % (ref 0–1)
BASOPHILS # BLD: 0 % (ref 0–1)
BDY SITE: ABNORMAL
BILIRUB SERPL-MCNC: 0.9 MG/DL (ref 0.2–1)
BILIRUB UR QL: NEGATIVE
BUN BLD-MCNC: 21 MG/DL (ref 9–20)
BUN SERPL-MCNC: 14 MG/DL (ref 6–20)
BUN SERPL-MCNC: 20 MG/DL (ref 6–20)
BUN/CREAT SERPL: 16 (ref 12–20)
BUN/CREAT SERPL: 18 (ref 12–20)
CA-I BLD-MCNC: 1.14 MMOL/L (ref 1.12–1.32)
CALCIUM SERPL-MCNC: 7.9 MG/DL (ref 8.5–10.1)
CALCIUM SERPL-MCNC: 9.8 MG/DL (ref 8.5–10.1)
CHLORIDE BLD-SCNC: 95 MMOL/L (ref 98–107)
CHLORIDE SERPL-SCNC: 105 MMOL/L (ref 97–108)
CHLORIDE SERPL-SCNC: 91 MMOL/L (ref 97–108)
CK SERPL-CCNC: 98 U/L (ref 39–308)
CO2 BLD-SCNC: 15 MMOL/L (ref 21–32)
CO2 SERPL-SCNC: 17 MMOL/L (ref 21–32)
CO2 SERPL-SCNC: 20 MMOL/L (ref 21–32)
COLOR UR: ABNORMAL
CREAT BLD-MCNC: 0.9 MG/DL (ref 0.6–1.3)
CREAT SERPL-MCNC: 0.78 MG/DL (ref 0.7–1.3)
CREAT SERPL-MCNC: 1.26 MG/DL (ref 0.7–1.3)
D50 ADMINISTERED, D50ADM: 0 ML
D50 ORDER, D50ORD: 0 ML
EOSINOPHIL # BLD: 0 K/UL (ref 0–0.4)
EOSINOPHIL # BLD: 0.1 K/UL (ref 0–0.4)
EOSINOPHIL NFR BLD: 0 % (ref 0–7)
EOSINOPHIL NFR BLD: 1 % (ref 0–7)
EPITH CASTS URNS QL MICRO: ABNORMAL /LPF
ERYTHROCYTE [DISTWIDTH] IN BLOOD BY AUTOMATED COUNT: 12.3 % (ref 11.5–14.5)
ERYTHROCYTE [DISTWIDTH] IN BLOOD BY AUTOMATED COUNT: 12.7 % (ref 11.5–14.5)
EST. AVERAGE GLUCOSE BLD GHB EST-MCNC: 260 MG/DL
GAS FLOW.O2 O2 DELIVERY SYS: ABNORMAL L/MIN
GLOBULIN SER CALC-MCNC: 3.4 G/DL (ref 2–4)
GLSCOM COMMENTS: NORMAL
GLUCOSE BLD STRIP.AUTO-MCNC: 150 MG/DL (ref 65–100)
GLUCOSE BLD STRIP.AUTO-MCNC: 184 MG/DL (ref 65–100)
GLUCOSE BLD STRIP.AUTO-MCNC: 229 MG/DL (ref 65–100)
GLUCOSE BLD STRIP.AUTO-MCNC: 295 MG/DL (ref 65–100)
GLUCOSE BLD STRIP.AUTO-MCNC: 359 MG/DL (ref 65–100)
GLUCOSE BLD STRIP.AUTO-MCNC: 496 MG/DL (ref 65–100)
GLUCOSE BLD-MCNC: 531 MG/DL (ref 65–100)
GLUCOSE SERPL-MCNC: 155 MG/DL (ref 65–100)
GLUCOSE SERPL-MCNC: 552 MG/DL (ref 65–100)
GLUCOSE UR STRIP.AUTO-MCNC: >1000 MG/DL
GLUCOSE, GLC: 150 MG/DL
GLUCOSE, GLC: 184 MG/DL
GLUCOSE, GLC: 229 MG/DL
GLUCOSE, GLC: 295 MG/DL
GLUCOSE, GLC: 359 MG/DL
GLUCOSE, GLC: 496 MG/DL
HBA1C MFR BLD: 10.7 % (ref 4.2–6.3)
HCO3 BLD-SCNC: 18.6 MMOL/L (ref 22–26)
HCT VFR BLD AUTO: 33.9 % (ref 36.6–50.3)
HCT VFR BLD AUTO: 49 % (ref 36.6–50.3)
HCT VFR BLD CALC: 50 % (ref 36.6–50.3)
HGB BLD-MCNC: 11.7 G/DL (ref 12.1–17)
HGB BLD-MCNC: 16.9 G/DL (ref 12.1–17)
HGB BLD-MCNC: 17 GM/DL (ref 12.1–17)
HGB UR QL STRIP: NEGATIVE
HIGH TARGET, HITG: 250 MG/DL
HYALINE CASTS URNS QL MICRO: ABNORMAL /LPF (ref 0–5)
INSULIN ADMINSTERED, INSADM: 2.7 UNITS/HOUR
INSULIN ADMINSTERED, INSADM: 3.7 UNITS/HOUR
INSULIN ADMINSTERED, INSADM: 5.1 UNITS/HOUR
INSULIN ADMINSTERED, INSADM: 6 UNITS/HOUR
INSULIN ADMINSTERED, INSADM: 7.1 UNITS/HOUR
INSULIN ADMINSTERED, INSADM: 8.7 UNITS/HOUR
INSULIN ORDER, INSORD: 2.7 UNITS/HOUR
INSULIN ORDER, INSORD: 3.7 UNITS/HOUR
INSULIN ORDER, INSORD: 5.1 UNITS/HOUR
INSULIN ORDER, INSORD: 6 UNITS/HOUR
INSULIN ORDER, INSORD: 7.1 UNITS/HOUR
INSULIN ORDER, INSORD: 8.7 UNITS/HOUR
KETONES UR QL STRIP.AUTO: >80 MG/DL
LEUKOCYTE ESTERASE UR QL STRIP.AUTO: NEGATIVE
LOW TARGET, LOT: 150 MG/DL
LYMPHOCYTES # BLD AUTO: 16 % (ref 12–49)
LYMPHOCYTES # BLD AUTO: 24 % (ref 12–49)
LYMPHOCYTES # BLD: 2.1 K/UL (ref 0.8–3.5)
LYMPHOCYTES # BLD: 2.3 K/UL (ref 0.8–3.5)
MAGNESIUM SERPL-MCNC: 1.7 MG/DL (ref 1.6–2.4)
MCH RBC QN AUTO: 31.5 PG (ref 26–34)
MCH RBC QN AUTO: 31.8 PG (ref 26–34)
MCHC RBC AUTO-ENTMCNC: 34.5 G/DL (ref 30–36.5)
MCHC RBC AUTO-ENTMCNC: 34.5 G/DL (ref 30–36.5)
MCV RBC AUTO: 91.4 FL (ref 80–99)
MCV RBC AUTO: 92.3 FL (ref 80–99)
MINUTES UNTIL NEXT BG, NBG: 60 MIN
MONOCYTES # BLD: 0.6 K/UL (ref 0–1)
MONOCYTES # BLD: 0.7 K/UL (ref 0–1)
MONOCYTES NFR BLD AUTO: 5 % (ref 5–13)
MONOCYTES NFR BLD AUTO: 7 % (ref 5–13)
MULTIPLIER, MUL: 0.02
MULTIPLIER, MUL: 0.02
MULTIPLIER, MUL: 0.03
NEUTS SEG # BLD: 10.3 K/UL (ref 1.8–8)
NEUTS SEG # BLD: 6.5 K/UL (ref 1.8–8)
NEUTS SEG NFR BLD AUTO: 69 % (ref 32–75)
NEUTS SEG NFR BLD AUTO: 78 % (ref 32–75)
NITRITE UR QL STRIP.AUTO: NEGATIVE
O2/TOTAL GAS SETTING VFR VENT: 21 %
ORDER INITIALS, ORDINIT: NORMAL
PCO2 BLD: 36.9 MMHG (ref 35–45)
PH BLD: 7.31 [PH] (ref 7.35–7.45)
PH UR STRIP: 5.5 [PH] (ref 5–8)
PHOSPHATE SERPL-MCNC: 3.1 MG/DL (ref 2.6–4.7)
PLATELET # BLD AUTO: 226 K/UL (ref 150–400)
PLATELET # BLD AUTO: 342 K/UL (ref 150–400)
PO2 BLD: 75 MMHG (ref 80–100)
POTASSIUM BLD-SCNC: 5.4 MMOL/L (ref 3.5–5.1)
POTASSIUM SERPL-SCNC: 4.4 MMOL/L (ref 3.5–5.1)
POTASSIUM SERPL-SCNC: 4.7 MMOL/L (ref 3.5–5.1)
PROT SERPL-MCNC: 7.5 G/DL (ref 6.4–8.2)
PROT UR STRIP-MCNC: NEGATIVE MG/DL
RBC # BLD AUTO: 3.71 M/UL (ref 4.1–5.7)
RBC # BLD AUTO: 5.31 M/UL (ref 4.1–5.7)
RBC #/AREA URNS HPF: ABNORMAL /HPF (ref 0–5)
SAO2 % BLD: 94 % (ref 92–97)
SERVICE CMNT-IMP: ABNORMAL
SODIUM BLD-SCNC: 129 MMOL/L (ref 136–145)
SODIUM SERPL-SCNC: 128 MMOL/L (ref 136–145)
SODIUM SERPL-SCNC: 132 MMOL/L (ref 136–145)
SP GR UR REFRACTOMETRY: 1.03 (ref 1–1.03)
SPECIMEN TYPE: ABNORMAL
TROPONIN I SERPL-MCNC: <0.04 NG/ML
TROPONIN I SERPL-MCNC: <0.04 NG/ML
UROBILINOGEN UR QL STRIP.AUTO: 0.2 EU/DL (ref 0.2–1)
VALPROATE SERPL-MCNC: 15 UG/ML (ref 50–100)
WBC # BLD AUTO: 13.1 K/UL (ref 4.1–11.1)
WBC # BLD AUTO: 9.4 K/UL (ref 4.1–11.1)
WBC URNS QL MICRO: ABNORMAL /HPF (ref 0–4)

## 2017-05-18 PROCEDURE — 82962 GLUCOSE BLOOD TEST: CPT

## 2017-05-18 PROCEDURE — 82010 KETONE BODYS QUAN: CPT | Performed by: EMERGENCY MEDICINE

## 2017-05-18 PROCEDURE — 80048 BASIC METABOLIC PNL TOTAL CA: CPT | Performed by: FAMILY MEDICINE

## 2017-05-18 PROCEDURE — 80053 COMPREHEN METABOLIC PANEL: CPT | Performed by: EMERGENCY MEDICINE

## 2017-05-18 PROCEDURE — 65610000006 HC RM INTENSIVE CARE

## 2017-05-18 PROCEDURE — 81001 URINALYSIS AUTO W/SCOPE: CPT | Performed by: FAMILY MEDICINE

## 2017-05-18 PROCEDURE — 36415 COLL VENOUS BLD VENIPUNCTURE: CPT | Performed by: FAMILY MEDICINE

## 2017-05-18 PROCEDURE — 74011250636 HC RX REV CODE- 250/636: Performed by: EMERGENCY MEDICINE

## 2017-05-18 PROCEDURE — 74011000258 HC RX REV CODE- 258: Performed by: FAMILY MEDICINE

## 2017-05-18 PROCEDURE — 74011250637 HC RX REV CODE- 250/637: Performed by: FAMILY MEDICINE

## 2017-05-18 PROCEDURE — 84100 ASSAY OF PHOSPHORUS: CPT | Performed by: FAMILY MEDICINE

## 2017-05-18 PROCEDURE — 85025 COMPLETE CBC W/AUTO DIFF WBC: CPT | Performed by: EMERGENCY MEDICINE

## 2017-05-18 PROCEDURE — 82550 ASSAY OF CK (CPK): CPT | Performed by: EMERGENCY MEDICINE

## 2017-05-18 PROCEDURE — 83036 HEMOGLOBIN GLYCOSYLATED A1C: CPT | Performed by: FAMILY MEDICINE

## 2017-05-18 PROCEDURE — 74011636637 HC RX REV CODE- 636/637: Performed by: FAMILY MEDICINE

## 2017-05-18 PROCEDURE — 80047 BASIC METABLC PNL IONIZED CA: CPT

## 2017-05-18 PROCEDURE — 71010 XR CHEST PORT: CPT

## 2017-05-18 PROCEDURE — 82803 BLOOD GASES ANY COMBINATION: CPT

## 2017-05-18 PROCEDURE — 84484 ASSAY OF TROPONIN QUANT: CPT | Performed by: EMERGENCY MEDICINE

## 2017-05-18 PROCEDURE — 74011250636 HC RX REV CODE- 250/636: Performed by: FAMILY MEDICINE

## 2017-05-18 PROCEDURE — 96361 HYDRATE IV INFUSION ADD-ON: CPT

## 2017-05-18 PROCEDURE — 36600 WITHDRAWAL OF ARTERIAL BLOOD: CPT

## 2017-05-18 PROCEDURE — 74011000258 HC RX REV CODE- 258: Performed by: EMERGENCY MEDICINE

## 2017-05-18 PROCEDURE — 77030032490 HC SLV COMPR SCD KNE COVD -B

## 2017-05-18 PROCEDURE — 96365 THER/PROPH/DIAG IV INF INIT: CPT

## 2017-05-18 PROCEDURE — 74011636637 HC RX REV CODE- 636/637: Performed by: EMERGENCY MEDICINE

## 2017-05-18 PROCEDURE — 93005 ELECTROCARDIOGRAM TRACING: CPT

## 2017-05-18 PROCEDURE — 80164 ASSAY DIPROPYLACETIC ACD TOT: CPT | Performed by: FAMILY MEDICINE

## 2017-05-18 PROCEDURE — 83735 ASSAY OF MAGNESIUM: CPT | Performed by: FAMILY MEDICINE

## 2017-05-18 PROCEDURE — 99284 EMERGENCY DEPT VISIT MOD MDM: CPT

## 2017-05-18 RX ORDER — MAGNESIUM SULFATE 100 %
4 CRYSTALS MISCELLANEOUS AS NEEDED
Status: DISCONTINUED | OUTPATIENT
Start: 2017-05-18 | End: 2017-05-21 | Stop reason: HOSPADM

## 2017-05-18 RX ORDER — DEXTROSE 50 % IN WATER (D50W) INTRAVENOUS SYRINGE
12.5-25 AS NEEDED
Status: DISCONTINUED | OUTPATIENT
Start: 2017-05-18 | End: 2017-05-18

## 2017-05-18 RX ORDER — MORPHINE SULFATE 2 MG/ML
1 INJECTION, SOLUTION INTRAMUSCULAR; INTRAVENOUS
Status: DISCONTINUED | OUTPATIENT
Start: 2017-05-18 | End: 2017-05-21 | Stop reason: HOSPADM

## 2017-05-18 RX ORDER — SODIUM CHLORIDE 0.9 % (FLUSH) 0.9 %
5-10 SYRINGE (ML) INJECTION AS NEEDED
Status: DISCONTINUED | OUTPATIENT
Start: 2017-05-18 | End: 2017-05-21 | Stop reason: HOSPADM

## 2017-05-18 RX ORDER — MAGNESIUM SULFATE 100 %
4 CRYSTALS MISCELLANEOUS AS NEEDED
Status: DISCONTINUED | OUTPATIENT
Start: 2017-05-18 | End: 2017-05-18

## 2017-05-18 RX ORDER — ZIPRASIDONE HYDROCHLORIDE 20 MG/1
80 CAPSULE ORAL 2 TIMES DAILY WITH MEALS
Status: DISCONTINUED | OUTPATIENT
Start: 2017-05-19 | End: 2017-05-21 | Stop reason: HOSPADM

## 2017-05-18 RX ORDER — GUAIFENESIN 100 MG/5ML
81 LIQUID (ML) ORAL DAILY
Status: DISCONTINUED | OUTPATIENT
Start: 2017-05-19 | End: 2017-05-21 | Stop reason: HOSPADM

## 2017-05-18 RX ORDER — MORPHINE SULFATE 2 MG/ML
2 INJECTION, SOLUTION INTRAMUSCULAR; INTRAVENOUS ONCE
Status: COMPLETED | OUTPATIENT
Start: 2017-05-18 | End: 2017-05-18

## 2017-05-18 RX ORDER — MIRTAZAPINE 30 MG/1
30 TABLET, FILM COATED ORAL
COMMUNITY
End: 2019-05-28

## 2017-05-18 RX ORDER — DIVALPROEX SODIUM 500 MG/1
500 TABLET, EXTENDED RELEASE ORAL 3 TIMES DAILY
Status: DISCONTINUED | OUTPATIENT
Start: 2017-05-18 | End: 2017-05-21 | Stop reason: HOSPADM

## 2017-05-18 RX ORDER — HYDROXYZINE PAMOATE 25 MG/1
25 CAPSULE ORAL 3 TIMES DAILY
COMMUNITY
End: 2019-05-28

## 2017-05-18 RX ORDER — BUSPIRONE HYDROCHLORIDE 10 MG/1
10 TABLET ORAL 2 TIMES DAILY
Status: DISCONTINUED | OUTPATIENT
Start: 2017-05-18 | End: 2017-05-21 | Stop reason: HOSPADM

## 2017-05-18 RX ORDER — BUSPIRONE HYDROCHLORIDE 10 MG/1
10 TABLET ORAL 2 TIMES DAILY
COMMUNITY
End: 2019-05-28

## 2017-05-18 RX ORDER — MIRTAZAPINE 15 MG/1
30 TABLET, FILM COATED ORAL
Status: DISCONTINUED | OUTPATIENT
Start: 2017-05-18 | End: 2017-05-21 | Stop reason: HOSPADM

## 2017-05-18 RX ORDER — SODIUM CHLORIDE 9 MG/ML
150 INJECTION, SOLUTION INTRAVENOUS CONTINUOUS
Status: DISCONTINUED | OUTPATIENT
Start: 2017-05-18 | End: 2017-05-19

## 2017-05-18 RX ORDER — INSULIN LISPRO 100 [IU]/ML
12-15 INJECTION, SOLUTION INTRAVENOUS; SUBCUTANEOUS
COMMUNITY
End: 2019-05-28

## 2017-05-18 RX ORDER — SODIUM CHLORIDE 0.9 % (FLUSH) 0.9 %
5-10 SYRINGE (ML) INJECTION EVERY 8 HOURS
Status: DISCONTINUED | OUTPATIENT
Start: 2017-05-18 | End: 2017-05-21 | Stop reason: HOSPADM

## 2017-05-18 RX ORDER — ONDANSETRON 2 MG/ML
4 INJECTION INTRAMUSCULAR; INTRAVENOUS
Status: DISCONTINUED | OUTPATIENT
Start: 2017-05-18 | End: 2017-05-21 | Stop reason: HOSPADM

## 2017-05-18 RX ADMIN — SODIUM CHLORIDE 3.7 UNITS/HR: 900 INJECTION, SOLUTION INTRAVENOUS at 23:17

## 2017-05-18 RX ADMIN — SODIUM CHLORIDE 5.1 UNITS/HR: 900 INJECTION, SOLUTION INTRAVENOUS at 20:54

## 2017-05-18 RX ADMIN — Medication 10 ML: at 21:44

## 2017-05-18 RX ADMIN — ONDANSETRON 4 MG: 2 INJECTION INTRAMUSCULAR; INTRAVENOUS at 21:34

## 2017-05-18 RX ADMIN — SODIUM CHLORIDE 8.7 UNITS/HR: 900 INJECTION, SOLUTION INTRAVENOUS at 17:45

## 2017-05-18 RX ADMIN — DIVALPROEX SODIUM 500 MG: 500 TABLET, FILM COATED, EXTENDED RELEASE ORAL at 21:42

## 2017-05-18 RX ADMIN — BUSPIRONE HYDROCHLORIDE 10 MG: 10 TABLET ORAL at 21:42

## 2017-05-18 RX ADMIN — SODIUM CHLORIDE 1000 ML: 900 INJECTION, SOLUTION INTRAVENOUS at 18:18

## 2017-05-18 RX ADMIN — Medication 1 MG: at 21:37

## 2017-05-18 RX ADMIN — Medication 2 MG: at 18:18

## 2017-05-18 RX ADMIN — SODIUM CHLORIDE 150 ML/HR: 900 INJECTION, SOLUTION INTRAVENOUS at 22:06

## 2017-05-18 RX ADMIN — SODIUM CHLORIDE 1000 ML: 900 INJECTION, SOLUTION INTRAVENOUS at 17:14

## 2017-05-18 RX ADMIN — MIRTAZAPINE 30 MG: 15 TABLET, FILM COATED ORAL at 21:42

## 2017-05-18 NOTE — IP AVS SNAPSHOT
[address] 
[phone] Patient: Eros Turcios MRN: [id number] [id number] You are allergic to the following No active allergies Recent Documentation Height Weight BMI Smoking Status 1.753 m 73.5 kg 23.93 kg/m2 Current Every Day Smoker Unresulted Labs Order Current Status BETA-HYDROXYBUTYRATE In process Emergency Contacts Name Discharge Info Relation Home Work Mobile Jean-Paul Mena DISCHARGE CAREGIVER [3] Father [15] [phone] About your hospitalization You were admitted on:  May 18, 2017 You last received care in the:  [facility] 4 IMCU 2 You were discharged on:  May 20, 2017 Unit phone number:  [phone] Why you were hospitalized Your primary diagnosis was:  Dka (Diabetic Ketoacidoses) ([facility]) Providers Seen During Your Hospitalizations Provider Role Specialty Primary office phone Spike Diaz MD Attending Provider Emergency Medicine [phone] Vonda Mora MD Attending Provider Hospitalist [phone] Juanita Bianchi MD Attending Provider Internal Medicine [phone] Your Primary Care Physician (PCP) Primary Care Physician Office Phone Office Fax Steva Day [phone] Follow-up Information None Current Discharge Medication List  
  
ASK your doctor about these medications Dose & Instructions Dispensing Information Comments Morning Noon Evening Bedtime  
 aspirin 81 mg chewable tablet Your last dose was: Your next dose is:    
   
   
 Dose:  81 mg Take 81 mg by mouth daily. Refills:  0  
     
   
   
   
  
 busPIRone 10 mg tablet Commonly known as:  BUSPAR Your last dose was: Your next dose is:    
   
   
 Dose:  10 mg Take 10 mg by mouth two (2) times a day. Refills:  0 DEPAKOTE  mg ER tablet Generic drug:  divalproex ER Your last dose was: Your next dose is:    
   
   
 Dose:  500 mg Take 500 mg by mouth three (3) times daily. Refills:  0  
     
   
   
   
  
 GEODON 80 mg capsule Generic drug:  ziprasidone Your last dose was: Your next dose is:    
   
   
 Dose:  80 mg Take 80 mg by mouth two (2) times daily (with meals). Refills:  0 HumaLOG 100 unit/mL injection Generic drug:  insulin lispro Your last dose was: Your next dose is:    
   
   
 Dose:  12-15 Units 12-15 Units by SubCUTAneous route three (3) times daily (with meals). Refills:  0  
     
   
   
   
  
 hydrOXYzine pamoate 25 mg capsule Commonly known as:  VISTARIL Your last dose was: Your next dose is:    
   
   
 Dose:  25 mg Take 25 mg by mouth three (3) times daily. Refills:  0  
     
   
   
   
  
 insulin  unit/mL injection Commonly known as:  Paige Vallejo Your last dose was: Your next dose is:    
   
   
 Dose:  40 Units 40 Units by SubCUTAneous route ACB/HS. Indications: DIABETES MELLITUS Refills:  0  
     
   
   
   
  
 lisinopril 20 mg tablet Commonly known as:  Gabriele Aragon Your last dose was: Your next dose is:    
   
   
 Dose:  20 mg Take 20 mg by mouth daily. Refills:  0  
     
   
   
   
  
 mirtazapine 30 mg tablet Commonly known as:  Abbipayam De La Rosa Your last dose was: Your next dose is:    
   
   
 Dose:  30 mg Take 30 mg by mouth nightly. Refills:  0 Discharge Instructions None Discharge Orders None Introducing [facility]! Stacy Nova introduces [employer] patient portal. Now you can access parts of your medical record, email your doctor's office, and request medication refills online.    
 
1. In your internet browser, go to [email or web address] 2. Click on the First Time User? Click Here link in the Sign In box. You will see the New Member Sign Up page. 3. Enter your [facility] Access Code exactly as it appears below. You will not need to use this code after youve completed the sign-up process. If you do not sign up before the expiration date, you must request a new code. · [facility] Access Code: [email or web address] Expires: 7/1/2017  2:26 PM 
 
4. Enter the last four digits of your Social Security Number (xxxx) and Date of Birth (mm/dd/yyyy) as indicated and click Submit. You will be taken to the next sign-up page. 5. Create a [facility] ID. This will be your [facility] login ID and cannot be changed, so think of one that is secure and easy to remember. 6. Create a [facility] password. You can change your password at any time. 7. Enter your Password Reset Question and Answer. This can be used at a later time if you forget your password. 8. Enter your e-mail address. You will receive e-mail notification when new information is available in [address] 9. Click Sign Up. You can now view and download portions of your medical record. 10. Click the Download Summary menu link to download a portable copy of your medical information. If you have questions, please visit the Frequently Asked Questions section of the [facility] website. Remember, [facility] is NOT to be used for urgent needs. For medical emergencies, dial 911. Now available from your iPhone and Android! General Information Please provide this summary of care documentation to your next provider. Patient Signature:  ____________________________________________________________ Date:  ____________________________________________________________  
  
Kaelyn Smooth Provider Signature:  ____________________________________________________________ Date:  ____________________________________________________________

## 2017-05-18 NOTE — PROGRESS NOTES
Admission Medication Reconciliation:    Information obtained from: Patient; Rx Query    Significant PMH/Disease States:   Past Medical History:   Diagnosis Date    [facility] disorder [facility] [address] [facility]     Depressive disorder     Diabetes ([facility] [address]     Type 1    Hypertension     Psychiatric disorder     bipolar    PTSD (post-traumatic stress disorder)     PTSD (post-traumatic stress disorder)        Chief Complaint for this Admission:  Elevated blood sugar    Allergies:  Review of patient's allergies indicates no known allergies. Prior to Admission Medications:   Prior to Admission Medications   Prescriptions Last Dose Informant Patient Reported? Taking?   aspirin 81 mg chewable tablet 2017 at Unknown time  Yes Yes   Sig: Take 81 mg by mouth daily. busPIRone (BUSPAR) 10 mg tablet 2017 at Unknown time  Yes Yes   Sig: Take 10 mg by mouth two (2) times a day. divalproex ER (DEPAKOTE ER) 500 mg ER tablet 2017 at Unknown time  Yes Yes   Sig: Take 500 mg by mouth three (3) times daily. hydrOXYzine pamoate (VISTARIL) 25 mg capsule 2017 at Unknown time  Yes Yes   Sig: Take 25 mg by mouth three (3) times daily. insulin NPH (NOVOLIN N, HUMULIN N) 100 unit/mL injection 2017 at Unknown time  Yes Yes   Si Units by SubCUTAneous route ACB/HS. Indications: DIABETES MELLITUS   insulin lispro (HUMALOG) 100 unit/mL injection 2017 at Unknown time  Yes Yes   Si-15 Units by SubCUTAneous route three (3) times daily (with meals). lisinopril (PRINIVIL, ZESTRIL) 20 mg tablet 2017 at Unknown time  Yes Yes   Sig: Take 20 mg by mouth daily. mirtazapine (REMERON) 30 mg tablet 2017 at Unknown time  Yes Yes   Sig: Take 30 mg by mouth nightly. ziprasidone (GEODON) 80 mg capsule 2017 at Unknown time  Yes Yes   Sig: Take 80 mg by mouth two (2) times daily (with meals). Facility-Administered Medications: None         Comments/Recommendations:  Added buspar, hydroxyzine, mirtazapine. Changed dose of humalog.     Karly Wong, RaphaelD, BCPS

## 2017-05-18 NOTE — IP AVS SNAPSHOT
Current Discharge Medication List  
  
ASK your doctor about these medications Dose & Instructions Dispensing Information Comments Morning Noon Evening Bedtime  
 aspirin 81 mg chewable tablet Your last dose was: Your next dose is:    
   
   
 Dose:  81 mg Take 81 mg by mouth daily. Refills:  0  
     
   
   
   
  
 busPIRone 10 mg tablet Commonly known as:  BUSPAR Your last dose was: Your next dose is:    
   
   
 Dose:  10 mg Take 10 mg by mouth two (2) times a day. Refills:  0  
     
   
   
   
  
 DEPAKOTE  mg ER tablet Generic drug:  divalproex ER Your last dose was: Your next dose is:    
   
   
 Dose:  500 mg Take 500 mg by mouth three (3) times daily. Refills:  0  
     
   
   
   
  
 GEODON 80 mg capsule Generic drug:  ziprasidone Your last dose was: Your next dose is:    
   
   
 Dose:  80 mg Take 80 mg by mouth two (2) times daily (with meals). Refills:  0 HumaLOG 100 unit/mL injection Generic drug:  insulin lispro Your last dose was: Your next dose is:    
   
   
 Dose:  12-15 Units 12-15 Units by SubCUTAneous route three (3) times daily (with meals). Refills:  0  
     
   
   
   
  
 hydrOXYzine pamoate 25 mg capsule Commonly known as:  VISTARIL Your last dose was: Your next dose is:    
   
   
 Dose:  25 mg Take 25 mg by mouth three (3) times daily. Refills:  0  
     
   
   
   
  
 insulin  unit/mL injection Commonly known as:  Sarah Clemens Your last dose was: Your next dose is:    
   
   
 Dose:  40 Units 40 Units by SubCUTAneous route ACB/HS. Indications: DIABETES MELLITUS Refills:  0  
     
   
   
   
  
 lisinopril 20 mg tablet Commonly known as:  Tanda Limerick Your last dose was:     
   
Your next dose is:    
   
   
 Dose:  20 mg  
 Take 20 mg by mouth daily. Refills:  0  
     
   
   
   
  
 mirtazapine 30 mg tablet Commonly known as:  Catherine Rosendo Your last dose was: Your next dose is:    
   
   
 Dose:  30 mg Take 30 mg by mouth nightly. Refills:  0

## 2017-05-18 NOTE — ED TRIAGE NOTES
Pt stated his blood sugar is 594, pt stated he ran out of his insulin and can not afford it, +vomiting today, +headache, denies fever

## 2017-05-18 NOTE — ED NOTES
Bedside shift change report given to Francie Balderas (oncoming nurse) by Alvino Baptiste (offgoing nurse). Report included the following information SBAR.

## 2017-05-18 NOTE — ED PROVIDER NOTES
HPI Comments: 46 y.o. male with past medical history significant for DM-1, depression, bipolar 1 disorder, PTSD, and HTN who presents with chief complaint of elevated blood glucose. Pt reports that yesterday at 1400 (~27 hours ago), he ran out of his insulin because he could not afford it and had not yet received his disability check. He then woke today with a BG of 594, accompanied by a HA, nausea, and vomiting x 2. Pt notes h/o DKA and states that his current sx feel similar. There are no other acute medical concerns at this time. Social hx: on disability    PCP: Noris Mack MD    Note written by Grzegorz Burris, as dictated by Trevor Marrufo MD 5:05 PM      The history is provided by the patient. No  was used. Past Medical History:   Diagnosis Date    [facility] disorder ([facility] [address]     Depressive disorder     Diabetes ([facility] [address]     Type 1    Hypertension     Psychiatric disorder     bipolar    PTSD (post-traumatic stress disorder)     PTSD (post-traumatic stress disorder)        Past Surgical History:   Procedure Laterality Date    HX APPENDECTOMY           History reviewed. No pertinent family history. Social History     Social History    Marital status:      Spouse name: N/A    Number of children: N/A    Years of education: N/A     Occupational History    Not on file. Social History Main Topics    Smoking status: Current Every Day Smoker     Packs/day: 1.00     Years: 0.00    Smokeless tobacco: Not on file    Alcohol use No      Comment: occasion    Drug use: Yes     Special: Heroin      Comment: Uses Heroin everyday    Sexual activity: Not on file     Other Topics Concern    Not on file     Social History Narrative         ALLERGIES: Review of patient's allergies indicates no known allergies. Review of Systems   Gastrointestinal: Positive for nausea and vomiting (x2). Neurological: Positive for headaches.    All other systems reviewed and are negative. Vitals:    05/18/17 1549   BP: 120/80   Pulse: (!) 129   Resp: 16   Temp: 98.2 °F (36.8 °C)   SpO2: 97%   Weight: 74 kg (163 lb 1 oz)   Height: 5' 9\" (1.753 m)            Physical Exam   Constitutional: He is oriented to person, place, and time. He appears well-developed and well-nourished. No distress. HENT:   Head: Normocephalic and atraumatic. Eyes: Conjunctivae are normal. No scleral icterus. Neck: Neck supple. No tracheal deviation present. Cardiovascular: Normal rate, regular rhythm, normal heart sounds and intact distal pulses. Exam reveals no gallop and no friction rub. No murmur heard. Pulmonary/Chest: Effort normal and breath sounds normal. He has no wheezes. He has no rales. No kussmaul respirations   Abdominal: Soft. He exhibits no distension. There is no tenderness. There is no rebound and no guarding. Musculoskeletal: He exhibits no edema. Neurological: He is alert and oriented to person, place, and time. Skin: Skin is warm and dry. No rash noted. Psychiatric: He has a normal mood and affect. Nursing note and vitals reviewed. Note written by Daniela Sloan. Florencia Arguello, as dictated by Eden Pierre MD 5:05 PM         MDM  Number of Diagnoses or Management Options  Diabetic ketoacidosis without coma associated with type 1 diabetes mellitus [facility]):      Amount and/or Complexity of Data Reviewed  Clinical lab tests: ordered and reviewed  Tests in the radiology section of CPT®: ordered and reviewed  Tests in the medicine section of CPT®: ordered and reviewed  Discussion of test results with the performing providers: yes  Obtain history from someone other than the patient: yes  Discuss the patient with other providers: yes    Critical Care  Total time providing critical care: 30-74 minutes    Patient Progress  Patient progress: improved    ED Course       Procedures    ED EKG interpretation:  Rhythm: normal sinus rhythm; and regular . Rate (approx.): 100;  Axis: normal; ST/T wave: normal;   Note written by Kyrsta Duff, as dictated by Jian Kumar MD 5:23 PM    PROGRESS NOTE:  5:37 PM  Patient is in early DKA; glucose stabilizers, insulin infusion, and IV fluids have been instituted. Will consult the hospitalist for admission.     Total critical care time spend exclusive of procedures:  34  minutes

## 2017-05-19 LAB
ADMINISTERED INITIALS, ADMINIT: NORMAL
ANION GAP BLD CALC-SCNC: 10 MMOL/L (ref 5–15)
ANION GAP BLD CALC-SCNC: 12 MMOL/L (ref 5–15)
ATRIAL RATE: 100 BPM
BASOPHILS # BLD AUTO: 0 K/UL (ref 0–0.1)
BASOPHILS # BLD: 0 % (ref 0–1)
BUN SERPL-MCNC: 12 MG/DL (ref 6–20)
BUN SERPL-MCNC: 12 MG/DL (ref 6–20)
BUN/CREAT SERPL: 15 (ref 12–20)
BUN/CREAT SERPL: 16 (ref 12–20)
CALCIUM SERPL-MCNC: 7.6 MG/DL (ref 8.5–10.1)
CALCIUM SERPL-MCNC: 7.8 MG/DL (ref 8.5–10.1)
CALCULATED P AXIS, ECG09: 55 DEGREES
CALCULATED R AXIS, ECG10: -113 DEGREES
CALCULATED T AXIS, ECG11: 49 DEGREES
CHLORIDE SERPL-SCNC: 103 MMOL/L (ref 97–108)
CHLORIDE SERPL-SCNC: 106 MMOL/L (ref 97–108)
CO2 SERPL-SCNC: 17 MMOL/L (ref 21–32)
CO2 SERPL-SCNC: 19 MMOL/L (ref 21–32)
CREAT SERPL-MCNC: 0.77 MG/DL (ref 0.7–1.3)
CREAT SERPL-MCNC: 0.78 MG/DL (ref 0.7–1.3)
D50 ADMINISTERED, D50ADM: 0 ML
D50 ORDER, D50ORD: 0 ML
DIAGNOSIS, 93000: NORMAL
EOSINOPHIL # BLD: 0.2 K/UL (ref 0–0.4)
EOSINOPHIL NFR BLD: 3 % (ref 0–7)
ERYTHROCYTE [DISTWIDTH] IN BLOOD BY AUTOMATED COUNT: 12.5 % (ref 11.5–14.5)
EST. AVERAGE GLUCOSE BLD GHB EST-MCNC: 269 MG/DL
GLSCOM COMMENTS: NORMAL
GLUCOSE BLD STRIP.AUTO-MCNC: 127 MG/DL (ref 65–100)
GLUCOSE BLD STRIP.AUTO-MCNC: 155 MG/DL (ref 65–100)
GLUCOSE BLD STRIP.AUTO-MCNC: 167 MG/DL (ref 65–100)
GLUCOSE BLD STRIP.AUTO-MCNC: 172 MG/DL (ref 65–100)
GLUCOSE BLD STRIP.AUTO-MCNC: 225 MG/DL (ref 65–100)
GLUCOSE BLD STRIP.AUTO-MCNC: 233 MG/DL (ref 65–100)
GLUCOSE BLD STRIP.AUTO-MCNC: 233 MG/DL (ref 65–100)
GLUCOSE BLD STRIP.AUTO-MCNC: 248 MG/DL (ref 65–100)
GLUCOSE BLD STRIP.AUTO-MCNC: 258 MG/DL (ref 65–100)
GLUCOSE BLD STRIP.AUTO-MCNC: 277 MG/DL (ref 65–100)
GLUCOSE BLD STRIP.AUTO-MCNC: 295 MG/DL (ref 65–100)
GLUCOSE BLD STRIP.AUTO-MCNC: 304 MG/DL (ref 65–100)
GLUCOSE BLD STRIP.AUTO-MCNC: 324 MG/DL (ref 65–100)
GLUCOSE BLD STRIP.AUTO-MCNC: 85 MG/DL (ref 65–100)
GLUCOSE BLD STRIP.AUTO-MCNC: 94 MG/DL (ref 65–100)
GLUCOSE SERPL-MCNC: 211 MG/DL (ref 65–100)
GLUCOSE SERPL-MCNC: 301 MG/DL (ref 65–100)
GLUCOSE, GLC: 127 MG/DL
GLUCOSE, GLC: 155 MG/DL
GLUCOSE, GLC: 167 MG/DL
GLUCOSE, GLC: 172 MG/DL
GLUCOSE, GLC: 233 MG/DL
GLUCOSE, GLC: 233 MG/DL
GLUCOSE, GLC: 248 MG/DL
GLUCOSE, GLC: 258 MG/DL
GLUCOSE, GLC: 277 MG/DL
GLUCOSE, GLC: 295 MG/DL
GLUCOSE, GLC: 304 MG/DL
GLUCOSE, GLC: 85 MG/DL
GLUCOSE, GLC: 94 MG/DL
HBA1C MFR BLD: 11 % (ref 4.2–6.3)
HCT VFR BLD AUTO: 40.7 % (ref 36.6–50.3)
HGB BLD-MCNC: 14.3 G/DL (ref 12.1–17)
HIGH TARGET, HITG: 250 MG/DL
INSULIN ADMINSTERED, INSADM: 0 UNITS/HOUR
INSULIN ADMINSTERED, INSADM: 0.1 UNITS/HOUR
INSULIN ADMINSTERED, INSADM: 0.3 UNITS/HOUR
INSULIN ADMINSTERED, INSADM: 1.3 UNITS/HOUR
INSULIN ADMINSTERED, INSADM: 2.2 UNITS/HOUR
INSULIN ADMINSTERED, INSADM: 4.5 UNITS/HOUR
INSULIN ADMINSTERED, INSADM: 4.7 UNITS/HOUR
INSULIN ADMINSTERED, INSADM: 5.2 UNITS/HOUR
INSULIN ADMINSTERED, INSADM: 7.3 UNITS/HOUR
INSULIN ADMINSTERED, INSADM: 7.9 UNITS/HOUR
INSULIN ORDER, INSORD: 0 UNITS/HOUR
INSULIN ORDER, INSORD: 0.1 UNITS/HOUR
INSULIN ORDER, INSORD: 0.3 UNITS/HOUR
INSULIN ORDER, INSORD: 1.3 UNITS/HOUR
INSULIN ORDER, INSORD: 2.2 UNITS/HOUR
INSULIN ORDER, INSORD: 4.5 UNITS/HOUR
INSULIN ORDER, INSORD: 4.7 UNITS/HOUR
INSULIN ORDER, INSORD: 5.2 UNITS/HOUR
INSULIN ORDER, INSORD: 7.3 UNITS/HOUR
INSULIN ORDER, INSORD: 7.9 UNITS/HOUR
LOW TARGET, LOT: 150 MG/DL
LYMPHOCYTES # BLD AUTO: 39 % (ref 12–49)
LYMPHOCYTES # BLD: 3.8 K/UL (ref 0.8–3.5)
MAGNESIUM SERPL-MCNC: 1.5 MG/DL (ref 1.6–2.4)
MAGNESIUM SERPL-MCNC: 1.6 MG/DL (ref 1.6–2.4)
MCH RBC QN AUTO: 32.1 PG (ref 26–34)
MCHC RBC AUTO-ENTMCNC: 35.1 G/DL (ref 30–36.5)
MCV RBC AUTO: 91.5 FL (ref 80–99)
MINUTES UNTIL NEXT BG, NBG: 60 MIN
MONOCYTES # BLD: 0.8 K/UL (ref 0–1)
MONOCYTES NFR BLD AUTO: 8 % (ref 5–13)
MULTIPLIER, MUL: 0
MULTIPLIER, MUL: 0.01
MULTIPLIER, MUL: 0.01
MULTIPLIER, MUL: 0.02
MULTIPLIER, MUL: 0.02
MULTIPLIER, MUL: 0.03
MULTIPLIER, MUL: 0.03
MULTIPLIER, MUL: 0.04
MULTIPLIER, MUL: 0.04
NEUTS SEG # BLD: 4.9 K/UL (ref 1.8–8)
NEUTS SEG NFR BLD AUTO: 50 % (ref 32–75)
ORDER INITIALS, ORDINIT: NORMAL
P-R INTERVAL, ECG05: 142 MS
PLATELET # BLD AUTO: 271 K/UL (ref 150–400)
POTASSIUM SERPL-SCNC: 4.5 MMOL/L (ref 3.5–5.1)
POTASSIUM SERPL-SCNC: 4.8 MMOL/L (ref 3.5–5.1)
Q-T INTERVAL, ECG07: 322 MS
QRS DURATION, ECG06: 86 MS
QTC CALCULATION (BEZET), ECG08: 415 MS
RBC # BLD AUTO: 4.45 M/UL (ref 4.1–5.7)
SERVICE CMNT-IMP: ABNORMAL
SERVICE CMNT-IMP: NORMAL
SERVICE CMNT-IMP: NORMAL
SODIUM SERPL-SCNC: 132 MMOL/L (ref 136–145)
SODIUM SERPL-SCNC: 135 MMOL/L (ref 136–145)
TROPONIN I SERPL-MCNC: <0.04 NG/ML
VENTRICULAR RATE, ECG03: 100 BPM
WBC # BLD AUTO: 9.7 K/UL (ref 4.1–11.1)

## 2017-05-19 PROCEDURE — 83735 ASSAY OF MAGNESIUM: CPT | Performed by: FAMILY MEDICINE

## 2017-05-19 PROCEDURE — 74011000258 HC RX REV CODE- 258: Performed by: NURSE PRACTITIONER

## 2017-05-19 PROCEDURE — 65660000001 HC RM ICU INTERMED STEPDOWN

## 2017-05-19 PROCEDURE — 74011250636 HC RX REV CODE- 250/636: Performed by: STUDENT IN AN ORGANIZED HEALTH CARE EDUCATION/TRAINING PROGRAM

## 2017-05-19 PROCEDURE — 36415 COLL VENOUS BLD VENIPUNCTURE: CPT | Performed by: FAMILY MEDICINE

## 2017-05-19 PROCEDURE — 84484 ASSAY OF TROPONIN QUANT: CPT | Performed by: FAMILY MEDICINE

## 2017-05-19 PROCEDURE — 74011636637 HC RX REV CODE- 636/637: Performed by: STUDENT IN AN ORGANIZED HEALTH CARE EDUCATION/TRAINING PROGRAM

## 2017-05-19 PROCEDURE — 82962 GLUCOSE BLOOD TEST: CPT

## 2017-05-19 PROCEDURE — 74011250636 HC RX REV CODE- 250/636: Performed by: FAMILY MEDICINE

## 2017-05-19 PROCEDURE — 85025 COMPLETE CBC W/AUTO DIFF WBC: CPT | Performed by: FAMILY MEDICINE

## 2017-05-19 PROCEDURE — 74011250637 HC RX REV CODE- 250/637: Performed by: FAMILY MEDICINE

## 2017-05-19 PROCEDURE — 83036 HEMOGLOBIN GLYCOSYLATED A1C: CPT | Performed by: FAMILY MEDICINE

## 2017-05-19 PROCEDURE — 80048 BASIC METABOLIC PNL TOTAL CA: CPT | Performed by: FAMILY MEDICINE

## 2017-05-19 RX ORDER — INSULIN LISPRO 100 [IU]/ML
INJECTION, SOLUTION INTRAVENOUS; SUBCUTANEOUS
Status: DISCONTINUED | OUTPATIENT
Start: 2017-05-19 | End: 2017-05-21 | Stop reason: HOSPADM

## 2017-05-19 RX ORDER — DEXTROSE 50 % IN WATER (D50W) INTRAVENOUS SYRINGE
12.5-25 AS NEEDED
Status: DISCONTINUED | OUTPATIENT
Start: 2017-05-19 | End: 2017-05-19 | Stop reason: SDUPTHER

## 2017-05-19 RX ORDER — MAGNESIUM SULFATE 100 %
4 CRYSTALS MISCELLANEOUS AS NEEDED
Status: DISCONTINUED | OUTPATIENT
Start: 2017-05-19 | End: 2017-05-19 | Stop reason: SDUPTHER

## 2017-05-19 RX ORDER — SODIUM CHLORIDE 9 MG/ML
125 INJECTION, SOLUTION INTRAVENOUS CONTINUOUS
Status: DISCONTINUED | OUTPATIENT
Start: 2017-05-19 | End: 2017-05-21 | Stop reason: HOSPADM

## 2017-05-19 RX ORDER — DEXTROSE MONOHYDRATE AND SODIUM CHLORIDE 5; .9 G/100ML; G/100ML
125 INJECTION, SOLUTION INTRAVENOUS CONTINUOUS
Status: DISCONTINUED | OUTPATIENT
Start: 2017-05-19 | End: 2017-05-19

## 2017-05-19 RX ORDER — INSULIN LISPRO 100 [IU]/ML
8 INJECTION, SOLUTION INTRAVENOUS; SUBCUTANEOUS
Status: DISCONTINUED | OUTPATIENT
Start: 2017-05-20 | End: 2017-05-21 | Stop reason: HOSPADM

## 2017-05-19 RX ADMIN — Medication 1 MG: at 08:58

## 2017-05-19 RX ADMIN — INSULIN LISPRO 4 UNITS: 100 INJECTION, SOLUTION INTRAVENOUS; SUBCUTANEOUS at 22:02

## 2017-05-19 RX ADMIN — SODIUM CHLORIDE 125 ML/HR: 900 INJECTION, SOLUTION INTRAVENOUS at 12:03

## 2017-05-19 RX ADMIN — BUSPIRONE HYDROCHLORIDE 10 MG: 10 TABLET ORAL at 18:41

## 2017-05-19 RX ADMIN — Medication 1 MG: at 23:56

## 2017-05-19 RX ADMIN — DEXTROSE MONOHYDRATE AND SODIUM CHLORIDE 125 ML/HR: 5; .9 INJECTION, SOLUTION INTRAVENOUS at 08:58

## 2017-05-19 RX ADMIN — DIVALPROEX SODIUM 500 MG: 500 TABLET, FILM COATED, EXTENDED RELEASE ORAL at 22:01

## 2017-05-19 RX ADMIN — Medication 1 MG: at 19:39

## 2017-05-19 RX ADMIN — Medication 10 ML: at 15:21

## 2017-05-19 RX ADMIN — MIRTAZAPINE 30 MG: 15 TABLET, FILM COATED ORAL at 22:01

## 2017-05-19 RX ADMIN — Medication 10 ML: at 06:00

## 2017-05-19 RX ADMIN — DIVALPROEX SODIUM 500 MG: 500 TABLET, FILM COATED, EXTENDED RELEASE ORAL at 16:30

## 2017-05-19 RX ADMIN — ASPIRIN 81 MG CHEWABLE TABLET 81 MG: 81 TABLET CHEWABLE at 08:54

## 2017-05-19 RX ADMIN — DEXTROSE MONOHYDRATE AND SODIUM CHLORIDE 125 ML/HR: 5; .9 INJECTION, SOLUTION INTRAVENOUS at 02:31

## 2017-05-19 RX ADMIN — DIVALPROEX SODIUM 500 MG: 500 TABLET, FILM COATED, EXTENDED RELEASE ORAL at 08:54

## 2017-05-19 RX ADMIN — ZIPRASIDONE HYDROCHLORIDE 80 MG: 40 CAPSULE ORAL at 16:30

## 2017-05-19 RX ADMIN — BUSPIRONE HYDROCHLORIDE 10 MG: 10 TABLET ORAL at 08:54

## 2017-05-19 RX ADMIN — ZIPRASIDONE HYDROCHLORIDE 80 MG: 40 CAPSULE ORAL at 08:54

## 2017-05-19 RX ADMIN — Medication 10 ML: at 22:03

## 2017-05-19 RX ADMIN — Medication 1 MG: at 15:20

## 2017-05-19 RX ADMIN — HUMAN INSULIN 40 UNITS: 100 INJECTION, SUSPENSION SUBCUTANEOUS at 11:18

## 2017-05-19 RX ADMIN — Medication 10 ML: at 08:58

## 2017-05-19 RX ADMIN — Medication 10 ML: at 13:19

## 2017-05-19 RX ADMIN — INSULIN LISPRO 3 UNITS: 100 INJECTION, SOLUTION INTRAVENOUS; SUBCUTANEOUS at 16:29

## 2017-05-19 RX ADMIN — SODIUM CHLORIDE 125 ML/HR: 900 INJECTION, SOLUTION INTRAVENOUS at 20:05

## 2017-05-19 RX ADMIN — Medication 1 MG: at 03:47

## 2017-05-19 RX ADMIN — HUMAN INSULIN 40 UNITS: 100 INJECTION, SUSPENSION SUBCUTANEOUS at 22:02

## 2017-05-19 NOTE — PROGRESS NOTES
0800 Patient received awake in bed alert and oriented X4. On insulin gtt per glucose stabilizer. 1120 NPH given per order, insulin gtt remains on for 2 more hours    1315 Insulin gtt turned off.

## 2017-05-19 NOTE — ROUTINE PROCESS
TRANSFER - OUT REPORT:    Verbal report given to Hazel(name) on Marcos Castorena  being transferred to 7S(unit) for routine progression of care       Report consisted of patients Situation, Background, Assessment and   Recommendations(SBAR). Information from the following report(s) SBAR was reviewed with the receiving nurse. Lines:   Peripheral IV 01/22/17 Left Antecubital (Active)       Peripheral IV 01/23/17 Right Wrist (Active)       Peripheral IV 05/18/17 Right Antecubital (Active)   Site Assessment Clean, dry, & intact 5/18/2017  4:14 PM   Phlebitis Assessment 0 5/18/2017  4:14 PM   Infiltration Assessment 0 5/18/2017  4:14 PM   Dressing Status Clean, dry, & intact 5/18/2017  4:14 PM   Dressing Type Tape;Transparent 5/18/2017  4:14 PM   Hub Color/Line Status Pink;Capped;Flushed;Patent 5/18/2017  4:14 PM   Action Taken Blood drawn 5/18/2017  4:14 PM        Opportunity for questions and clarification was provided.       Patient transported with:   Monitor  Registered Nurse

## 2017-05-19 NOTE — PROGRESS NOTES
Hospitalist Progress Note  Mala Westbrook MD  Office: [phone]      Date of Service:  2017  NAME:  Sammie Kehr  :  1965  MRN:  [id number]      Admission Summary:   46year old male with history of DM-1, depression / bipolar disorder, PTSD, HTN presented on 17 with DKA. He had run out of his insulin because he had not received his disability check yet. Interval history / Subjective:     He feel generally well, reports only slight headache. No abdominal pain, nausea, vomiting. Assessment & Plan:     1. Diabetic ketoacidosis, DM-1   - suboptimal control, Hgb A1c = 10.7, DKA triggered by medication non-adherence   - ABG  - 7.310/36.9//18.6   - he had been treated with insulin drip, gap now closed   - resumed home insulin NPH   - IV fluids   - add insulin lispro 8 units TID-AC   - sliding scale insulin coverage   - DTC consulted    2. Bipolar disorder   - continue home Remeron, Depakote, Buspar, Geodon    3. Pseudohyponatremia   - secondary to hyperglycemia, improving   - continue IV fluids    Code status: FULL  DVT prophylaxis: SCDs    Care Plan discussed with: Patient/Family and Nurse  Disposition: Home w/Family      Hospital Problems  Date Reviewed: 2017          Codes Class Noted POA    * (Principal)[facility] ICD-10-CM: E13.10  ICD-9-CM: 250.10  2017 Unknown                Review of Systems:   A comprehensive review of systems was negative except for that written in the HPI. Vital Signs:    Last 24hrs VS reviewed since prior progress note.  Most recent are:  Visit Vitals    /69 (BP 1 Location: Left arm, BP Patient Position: At rest)    Pulse 72    Temp 98.7 °F (37.1 °C)    Resp 16    Ht 5' 9\" (1.753 m)    Wt 73.9 kg (162 lb 14.4 oz)    SpO2 95%    BMI 24.06 kg/m2         Intake/Output Summary (Last 24 hours) at 17 5044  Last data filed at 17 1818   Gross per 24 hour   Intake          2213.13 ml   Output             3800 ml   Net         -1586.87 ml        Physical Examination:             Constitutional:  No acute distress, cooperative, pleasant    ENT:  Oral mucous moist, oropharynx benign. Neck supple,    Resp:  CTA bilaterally. No wheezing/rhonchi/rales. No accessory muscle use   CV:  Regular rhythm, normal rate, no murmurs, gallops, rubs    GI:  Soft, non distended, non tender. normoactive bowel sounds, no hepatosplenomegaly     Musculoskeletal:  No edema, warm, 2+ pulses throughout    Neurologic:  Moves all extremities. AAOx3, CN II-XII reviewed            Data Review:    Review and/or order of clinical lab test      Labs:     Recent Labs      05/19/17   0338  05/18/17   2148   WBC  9.7  9.4   HGB  14.3  11.7*   HCT  40.7  33.9*   PLT  271  226     Recent Labs      05/19/17   0809  05/19/17   0338  05/18/17   2325   NA  132*  135*  132*   K  4.5  4.8  4.4   CL  103  106  105   CO2  17*  19*  20*   BUN  12  12  14   CREA  0.78  0.77  0.78   GLU  301*  211*  155*   CA  7.8*  7.6*  7.9*   MG  1.5*  1.6  1.7   PHOS   --    --   3.1     Recent Labs      05/18/17   1607   SGOT  13*   ALT  22   AP  83   TBILI  0.9   TP  7.5   ALB  4.1   GLOB  3.4     No results for input(s): INR, PTP, APTT in the last 72 hours. No lab exists for component: INREXT   No results for input(s): FE, TIBC, PSAT, FERR in the last 72 hours. No results found for: FOL, RBCF   No results for input(s): PH, PCO2, PO2 in the last 72 hours.   Recent Labs      05/19/17   0809  05/18/17   2148  05/18/17   1607   TROIQ  <0.04  <0.04  <0.04     Lab Results   Component Value Date/Time    Cholesterol, total 105 03/19/2016 08:23 AM    HDL Cholesterol 32 03/19/2016 08:23 AM    LDL, calculated 56.6 03/19/2016 08:23 AM    Triglyceride 82 03/19/2016 08:23 AM    CHOL/HDL Ratio 3.3 03/19/2016 08:23 AM     Lab Results   Component Value Date/Time    Glucose (POC) 225 05/19/2017 04:20 PM    Glucose (POC) 172 05/19/2017 12:32 PM    Glucose (POC) 258 05/19/2017 11:42 AM    Glucose (POC) 233 05/19/2017 10:42 AM    Glucose (POC) 304 05/19/2017 09:44 AM     Lab Results   Component Value Date/Time    Color YELLOW/STRAW 05/18/2017 08:08 PM    Appearance CLEAR 05/18/2017 08:08 PM    Specific gravity 1.027 05/18/2017 08:08 PM    Specific gravity 1.010 06/13/2015 05:36 PM    pH (UA) 5.5 05/18/2017 08:08 PM    Protein NEGATIVE  05/18/2017 08:08 PM    Glucose >1000 05/18/2017 08:08 PM    Ketone >80 05/18/2017 08:08 PM    Bilirubin NEGATIVE  05/18/2017 08:08 PM    Urobilinogen 0.2 05/18/2017 08:08 PM    Nitrites NEGATIVE  05/18/2017 08:08 PM    Leukocyte Esterase NEGATIVE  05/18/2017 08:08 PM    Epithelial cells FEW 05/18/2017 08:08 PM    Bacteria NEGATIVE  05/18/2017 08:08 PM    WBC 0-4 05/18/2017 08:08 PM    RBC 0-5 05/18/2017 08:08 PM         Medications Reviewed:     Current Facility-Administered Medications   Medication Dose Route Frequency    insulin NPH (NOVOLIN N, HUMULIN N) injection 40 Units  40 Units SubCUTAneous ACB/HS    0.9% sodium chloride infusion  125 mL/hr IntraVENous CONTINUOUS    insulin lispro (HUMALOG) injection   SubCUTAneous AC&HS    [START ON 5/20/2017] insulin lispro (HUMALOG) injection 8 Units  8 Units SubCUTAneous TIDAC    sodium chloride (NS) flush 5-10 mL  5-10 mL IntraVENous PRN    dextrose 10 % infusion 125-250 mL  125-250 mL IntraVENous PRN    aspirin chewable tablet 81 mg  81 mg Oral DAILY    busPIRone (BUSPAR) tablet 10 mg  10 mg Oral BID    divalproex ER (DEPAKOTE ER) 24 hour tablet 500 mg  500 mg Oral TID    mirtazapine (REMERON) tablet 30 mg  30 mg Oral QHS    ziprasidone (GEODON) capsule 80 mg  80 mg Oral BID WITH MEALS    sodium chloride (NS) flush 5-10 mL  5-10 mL IntraVENous Q8H    sodium chloride (NS) flush 5-10 mL  5-10 mL IntraVENous PRN    glucose chewable tablet 16 g  4 Tab Oral PRN    glucagon (GLUCAGEN) injection 1 mg  1 mg IntraMUSCular PRN    morphine injection 1 mg  1 mg IntraVENous Q4H PRN    ondansetron (ZOFRAN) injection 4 mg  4 mg IntraVENous Q8H PRN     ______________________________________________________________________  EXPECTED LENGTH OF STAY: 3d 0h  ACTUAL LENGTH OF STAY:          1                 Nicolas Phillips MD   Critical care time spent with patient: 35 minutes

## 2017-05-19 NOTE — ROUTINE PROCESS
0730 Bedside and Verbal shift change report given to Dai Bay RN (oncoming nurse) by Derrell Box RN (offgoing nurse). Report included the following information SBAR, Kardex, Intake/Output, MAR, Recent Results and Med Rec Status. 1640 TRANSFER - OUT REPORT:    Verbal report given to HECTOR Villegas(name) on Sammie Kehr  being transferred to [facility]) for routine progression of care       Report consisted of patients Situation, Background, Assessment and   Recommendations(SBAR). Information from the following report(s) SBAR, Kardex, Intake/Output, MAR, Recent Results, Med Rec Status and Cardiac Rhythm NSR was reviewed with the receiving nurse. Lines:   Peripheral IV 01/22/17 Left Antecubital (Active)       Peripheral IV 01/23/17 Right Wrist (Active)       Peripheral IV 05/18/17 Right Antecubital (Active)   Site Assessment Clean, dry, & intact 5/19/2017 12:00 PM   Phlebitis Assessment 0 5/19/2017 12:00 PM   Infiltration Assessment 0 5/19/2017 12:00 PM   Dressing Status Clean, dry, & intact 5/19/2017 12:00 PM   Dressing Type Tape;Transparent 5/19/2017 12:00 PM   Hub Color/Line Status Pink; Infusing 5/19/2017 12:00 PM   Action Taken Open ports on tubing capped 5/19/2017  4:00 AM   Alcohol Cap Used Yes 5/19/2017 12:00 PM       Peripheral IV 05/18/17 Left Arm (Active)   Site Assessment Clean, dry, & intact 5/19/2017 12:00 PM   Phlebitis Assessment 0 5/19/2017 12:00 PM   Infiltration Assessment 0 5/19/2017 12:00 PM   Dressing Status Clean, dry, & intact 5/19/2017 12:00 PM   Dressing Type Tape;Transparent 5/19/2017 12:00 PM   Hub Color/Line Status Pink; Infusing 5/19/2017 12:00 PM   Action Taken Open ports on tubing capped 5/19/2017  4:00 AM   Alcohol Cap Used Yes 5/19/2017 12:00 PM        Opportunity for questions and clarification was provided.       Patient transported with:   Monitor  Registered Nurse  Tech

## 2017-05-19 NOTE — H&P
[address]   [facility], [address]   HISTORY AND PHYSICAL       Name:  Farrah Castellon   MR#:  [id number]   :  1965   Account #:  [de-identified]        Date of Adm:  2017       CHIEF COMPLAINT: Elevated blood glucose. HISTORY OF PRESENT ILLNESS: The patient is a 59-year-old   gentleman with past history of diabetes mellitus type 1 on   insulin, history of depression, bipolar 1 disorder, PTSD,   and hypertension, who presents to the hospital complaining of the   above-mentioned symptoms. The patient reports that he ran out of his   insulin yesterday at about 2 o'clock, because he could not afford it,   as he has not received his disability check as of yet. The patient   reports that he woke up this morning and had a blood glucose level of   around 590. He felt a mild headache associated with some nausea,   and instantly knew that he was going to be in DKA. The patient reports   that he has been in DKA past, and got concerned for the same, and   decided to come to the hospital. The patient also reports that he had   some mild abdominal pain, that seems to have resolved as of now. The patient reports that he had 2 bouts of vomiting associated with his   symptoms, but currently feels much better. The patient denies any   other complaints or problems. He denies any headache, blurry vision,   sore throat, trouble swallowing, trouble with speech, any chest pain,   shortness of breath, cough, fever, chills, urinary symptoms, focal or   generalized neurological weakness, diarrhea, hematemesis, melena,   hemoptysis, any signs of infection, recent travels, sick contacts, or any   other concerns. PAST MEDICAL HISTORY: See above. HOME MEDICATIONS: Currently the patient is on   1. Humalog 12 to 15 units subcutaneously t.i.d. as needed. 2. BuSpar 10 mg b.i.d.   3. Vistaril 25 mg t.i.d.   4. Mirtazapine 30 mg at bedtime.    5. Insulin NPH 40 units subcutaneously a.c. and at bedtime. 6. Depakote 500 mg t.i.d.   5. Lisinopril 20 mg daily. 6. Aspirin 81 mg daily. 7. Geodon 80 mg b.i.d. SOCIAL HISTORY: The patient smokes 1 pack per day for about 40   years now. Denies alcohol use. Does heroin every day. Denies smoking pot. REVIEW OF SYSTEMS: A 10-point review of systems was done,   which was essentially negative except for the symptoms mentioned   above. ALLERGIES: NO KNOWN DRUG ALLERGIES. FAMILY HISTORY: Discussed and was found to be noncontributory. PHYSICAL EXAMINATION   VITAL SIGNS: Temperature 98.3, pulse 95, respiratory rate 16, blood   pressure 145/87, pulse oximetry 97% on room air. GENERAL: Alert and oriented x3, awake, mildly distressed. Pleasant   male, appears to be stated age. HEENT: Pupils equal and reactive to light. Dry mucous membranes. Tympanic membranes clear. NECK: Supple. CHEST: Clear to auscultation bilaterally. CORONARY: S1 and S2 were heard. ABDOMEN: Soft, nontender, nondistended. Bowel sounds are   physiological.   EXTREMITIES: No clubbing, no cyanosis, no edema. NEUROPSYCHIATRIC: Pleasant mood and affect. Sensory grossly   within normal limits. DTR 2+. Strength 5/5. SKIN: Warm. LABORATORY DATA: White count 13.1, hemoglobin 16.9, hematocrit   49, platelets 298. Sodium 128, potassium 4.7, chloride 91, bicarbonate   17, anion gap 20, glucose 552, BUN 20, creatinine 1.26, calcium 9.8,   bilirubin total 0.9, ALT 23, AST 13, alkaline phosphatase 83. CK 98,   troponin less than 0.04. Chest x-ray shows no acute disease. EKG   shows no acute abnormality. ASSESSMENT AND PLAN   1. Diabetic ketoacidosis. The patient will be admitted to the critical care   unit. We will start the patient on aggressive IV hydration, insulin drip. We will keep n.p.o. for now. Once the sugar stabilizes, we will start the   patient on D5 normal saline, and trend anion gap. We will get BMP   every 4 hours, and continue to closely monitor.  Once the   gap stabilizes, we will put the patient on scheduled insulin and   continue to monitor. May consider getting a  consult in   the morning. We will get a UA. Repeat electrolytes and further   intervention will be per hospital course. Reassess as needed. 2. Pseudohyponatremia, most likely secondary to #1. We will closely   monitor sodium. Provide IV hydration. Neurovascular checks. Further   intervention will be per hospital course. Reassess as needed. 3. History of bipolar disorder/post traumatic stress disorder (PTSD). Continue home medications. Continue to monitor. The patient   denies suicidal or homicidal ideation. 4. History of drug abuse. The patient was counseled. We will watch for   withdrawal. Continue to monitor, with neurovascular checks. 5. Hypertension. Continue home medications. 6. Gastrointestinal and deep vein thrombosis prophylaxis. The patient   will be on sequential compression devices.         Walker Russell MD MM / FS   D:  05/18/2017   19:45   T:  05/18/2017   22:17   Job #:  [id number]

## 2017-05-19 NOTE — PROGRESS NOTES
Patient with DKA can be managed in intermediate care unit    No critical care needs    We'll see as needed

## 2017-05-19 NOTE — PROGRESS NOTES
TRANSFER - IN REPORT:    Verbal report received from Chula Cartagena RN (name) on Pablo Allen  being received from ICU (unit) for routine progression of care      Report consisted of patients Situation, Background, Assessment and   Recommendations(SBAR). Information from the following report(s) SBAR was reviewed with the receiving nurse. Opportunity for questions and clarification was provided. Assessment completed upon patients arrival to unit and care assumed. Primary Nurse Jennifer Workman RN and [facility], RN performed a dual skin assessment on this patient No impairment noted.

## 2017-05-19 NOTE — DIABETES MGMT
DTC Consult Note    Recommendations/ Comments: Admitted in DKA due to not having NPH insulin secondary to lack of money. Patient known to DTC from previous admit. If appropriate, please consider adding pre-meal Humalog 8 units TID ac. Patient is knowledgeable about diabetes, but ran out of insulin. He buys generic insulin from [facility] and/or gets samples from his endocrinologist.     Consult received for:  [x]             Assessment of home management     [x]             DKA/HHS    Chart reviewed and initial evaluation complete on [facility]. Patient is a 46 y.o. male with Type 1 DM on NPH insulin 40 units BID and Humalog 12-15 units AC at home. BG monitoring at home 3x/day times per day. Patient reports BG levels at home vary  Assessed and instructed patient on the following:   ·  interpretation of lab results A1c elevated 12.5%. Reviewed goal of 7%   · blood sugar goals,   · complications of diabetes mellitus,   · hypoglycemia prevention and treatment,   · exercise,   · DKA causes, symptoms, actions to take  · illness management,   · SMBG skills and   · nutrition    Provided patient with the following: [x]             Survival skills education materials               [x]             Outpatient DTC contact number                 Discussed with patient and/or family need for follow up appointment for diabetes management after discharge.    He sees Dr. Reinaldo Lieberman in Feb 2017     A1c:   Lab Results   Component Value Date/Time    Hemoglobin A1c 11.0 05/19/2017 03:38 AM       Recent Glucose Results:   Lab Results   Component Value Date/Time     (H) 05/19/2017 08:09 AM     (H) 05/19/2017 03:38 AM     (H) 05/18/2017 11:25 PM    GLUCPOC 225 (H) 05/19/2017 04:20 PM    GLUCPOC 172 (H) 05/19/2017 12:32 PM    GLUCPOC 258 (H) 05/19/2017 11:42 AM        Lab Results   Component Value Date/Time    Creatinine 0.78 05/19/2017 08:09 AM       Active Orders   Diet    DIET DIABETIC CONSISTENT CARB Regular        PO intake: No data found. Current hospital DM medication: NPH 40 units BID, Humalog for correction, normal sensitivity  Will continue to follow as needed. Thank you.   rBian Hinds, MS, RN, CDE

## 2017-05-19 NOTE — ROUTINE PROCESS
2112: TRANSFER - IN REPORT:    Verbal report received from [facility] RN(name) on Sammie Kehr  being received from ED(unit) for routine progression of care    Report consisted of patients Situation, Background, Assessment and   Recommendations(SBAR). Information from the following report(s) SBAR, Kardex, ED Summary, Procedure Summary, Intake/Output, MAR, Accordion, Recent Results and Med Rec Status was reviewed with the receiving nurse. Opportunity for questions and clarification was provided. Assessment completed upon patients arrival to unit and care assumed. 2120: Primary Nurse Louie Miller and Ronny Cano, HECTOR performed a dual skin assessment on this patient No impairment noted  Jermaine score is 23      0730: Bedside and Verbal shift change report given to Vince Walls RN (oncoming nurse) by Maury Dior (offgoing nurse). Report included the following information SBAR, Kardex, ED Summary, Procedure Summary, Intake/Output, MAR, Accordion, Recent Results and Med Rec Status.

## 2017-05-20 LAB
ANION GAP BLD CALC-SCNC: 8 MMOL/L (ref 5–15)
BASOPHILS # BLD AUTO: 0 K/UL (ref 0–0.1)
BASOPHILS # BLD: 0 % (ref 0–1)
BUN SERPL-MCNC: 11 MG/DL (ref 6–20)
BUN/CREAT SERPL: 17 (ref 12–20)
CALCIUM SERPL-MCNC: 7.9 MG/DL (ref 8.5–10.1)
CHLORIDE SERPL-SCNC: 108 MMOL/L (ref 97–108)
CO2 SERPL-SCNC: 25 MMOL/L (ref 21–32)
CREAT SERPL-MCNC: 0.66 MG/DL (ref 0.7–1.3)
EOSINOPHIL # BLD: 0.2 K/UL (ref 0–0.4)
EOSINOPHIL NFR BLD: 3 % (ref 0–7)
ERYTHROCYTE [DISTWIDTH] IN BLOOD BY AUTOMATED COUNT: 12.5 % (ref 11.5–14.5)
GLUCOSE BLD STRIP.AUTO-MCNC: 115 MG/DL (ref 65–100)
GLUCOSE BLD STRIP.AUTO-MCNC: 149 MG/DL (ref 65–100)
GLUCOSE BLD STRIP.AUTO-MCNC: 274 MG/DL (ref 65–100)
GLUCOSE BLD STRIP.AUTO-MCNC: 48 MG/DL (ref 65–100)
GLUCOSE BLD STRIP.AUTO-MCNC: 51 MG/DL (ref 65–100)
GLUCOSE BLD STRIP.AUTO-MCNC: 56 MG/DL (ref 65–100)
GLUCOSE BLD STRIP.AUTO-MCNC: 71 MG/DL (ref 65–100)
GLUCOSE BLD STRIP.AUTO-MCNC: 96 MG/DL (ref 65–100)
GLUCOSE SERPL-MCNC: 111 MG/DL (ref 65–100)
HCT VFR BLD AUTO: 38.3 % (ref 36.6–50.3)
HGB BLD-MCNC: 13.4 G/DL (ref 12.1–17)
LYMPHOCYTES # BLD AUTO: 52 % (ref 12–49)
LYMPHOCYTES # BLD: 3.1 K/UL (ref 0.8–3.5)
MCH RBC QN AUTO: 31.7 PG (ref 26–34)
MCHC RBC AUTO-ENTMCNC: 35 G/DL (ref 30–36.5)
MCV RBC AUTO: 90.5 FL (ref 80–99)
MONOCYTES # BLD: 0.4 K/UL (ref 0–1)
MONOCYTES NFR BLD AUTO: 7 % (ref 5–13)
NEUTS SEG # BLD: 2.3 K/UL (ref 1.8–8)
NEUTS SEG NFR BLD AUTO: 38 % (ref 32–75)
PLATELET # BLD AUTO: 243 K/UL (ref 150–400)
POTASSIUM SERPL-SCNC: 3.8 MMOL/L (ref 3.5–5.1)
RBC # BLD AUTO: 4.23 M/UL (ref 4.1–5.7)
SERVICE CMNT-IMP: ABNORMAL
SERVICE CMNT-IMP: NORMAL
SERVICE CMNT-IMP: NORMAL
SODIUM SERPL-SCNC: 141 MMOL/L (ref 136–145)
TROPONIN I SERPL-MCNC: <0.04 NG/ML
WBC # BLD AUTO: 6.1 K/UL (ref 4.1–11.1)

## 2017-05-20 PROCEDURE — 74011250636 HC RX REV CODE- 250/636: Performed by: FAMILY MEDICINE

## 2017-05-20 PROCEDURE — 74011250637 HC RX REV CODE- 250/637: Performed by: FAMILY MEDICINE

## 2017-05-20 PROCEDURE — 80048 BASIC METABOLIC PNL TOTAL CA: CPT | Performed by: STUDENT IN AN ORGANIZED HEALTH CARE EDUCATION/TRAINING PROGRAM

## 2017-05-20 PROCEDURE — 85025 COMPLETE CBC W/AUTO DIFF WBC: CPT | Performed by: STUDENT IN AN ORGANIZED HEALTH CARE EDUCATION/TRAINING PROGRAM

## 2017-05-20 PROCEDURE — 74011636637 HC RX REV CODE- 636/637: Performed by: INTERNAL MEDICINE

## 2017-05-20 PROCEDURE — 65660000001 HC RM ICU INTERMED STEPDOWN

## 2017-05-20 PROCEDURE — 84484 ASSAY OF TROPONIN QUANT: CPT | Performed by: FAMILY MEDICINE

## 2017-05-20 PROCEDURE — 74011636637 HC RX REV CODE- 636/637: Performed by: STUDENT IN AN ORGANIZED HEALTH CARE EDUCATION/TRAINING PROGRAM

## 2017-05-20 PROCEDURE — 82962 GLUCOSE BLOOD TEST: CPT

## 2017-05-20 PROCEDURE — 36415 COLL VENOUS BLD VENIPUNCTURE: CPT | Performed by: STUDENT IN AN ORGANIZED HEALTH CARE EDUCATION/TRAINING PROGRAM

## 2017-05-20 PROCEDURE — 74011250636 HC RX REV CODE- 250/636: Performed by: STUDENT IN AN ORGANIZED HEALTH CARE EDUCATION/TRAINING PROGRAM

## 2017-05-20 RX ADMIN — Medication 1 MG: at 17:59

## 2017-05-20 RX ADMIN — DIVALPROEX SODIUM 500 MG: 500 TABLET, FILM COATED, EXTENDED RELEASE ORAL at 21:55

## 2017-05-20 RX ADMIN — BUSPIRONE HYDROCHLORIDE 10 MG: 10 TABLET ORAL at 08:59

## 2017-05-20 RX ADMIN — BUSPIRONE HYDROCHLORIDE 10 MG: 10 TABLET ORAL at 17:59

## 2017-05-20 RX ADMIN — ASPIRIN 81 MG CHEWABLE TABLET 81 MG: 81 TABLET CHEWABLE at 08:59

## 2017-05-20 RX ADMIN — SODIUM CHLORIDE 125 ML/HR: 900 INJECTION, SOLUTION INTRAVENOUS at 04:35

## 2017-05-20 RX ADMIN — HUMAN INSULIN 40 UNITS: 100 INJECTION, SUSPENSION SUBCUTANEOUS at 07:48

## 2017-05-20 RX ADMIN — MIRTAZAPINE 30 MG: 15 TABLET, FILM COATED ORAL at 21:55

## 2017-05-20 RX ADMIN — INSULIN LISPRO 8 UNITS: 100 INJECTION, SOLUTION INTRAVENOUS; SUBCUTANEOUS at 17:57

## 2017-05-20 RX ADMIN — INSULIN LISPRO 5 UNITS: 100 INJECTION, SOLUTION INTRAVENOUS; SUBCUTANEOUS at 17:57

## 2017-05-20 RX ADMIN — Medication 10 ML: at 21:57

## 2017-05-20 RX ADMIN — HUMAN INSULIN 40 UNITS: 100 INJECTION, SUSPENSION SUBCUTANEOUS at 17:58

## 2017-05-20 RX ADMIN — DIVALPROEX SODIUM 500 MG: 500 TABLET, FILM COATED, EXTENDED RELEASE ORAL at 08:59

## 2017-05-20 RX ADMIN — ZIPRASIDONE HYDROCHLORIDE 80 MG: 40 CAPSULE ORAL at 09:00

## 2017-05-20 RX ADMIN — ZIPRASIDONE HYDROCHLORIDE 80 MG: 40 CAPSULE ORAL at 18:29

## 2017-05-20 RX ADMIN — SODIUM CHLORIDE 125 ML/HR: 900 INJECTION, SOLUTION INTRAVENOUS at 23:39

## 2017-05-20 RX ADMIN — Medication 10 ML: at 07:49

## 2017-05-20 RX ADMIN — DIVALPROEX SODIUM 500 MG: 500 TABLET, FILM COATED, EXTENDED RELEASE ORAL at 17:11

## 2017-05-20 RX ADMIN — Medication 10 ML: at 14:00

## 2017-05-20 RX ADMIN — INSULIN LISPRO 8 UNITS: 100 INJECTION, SOLUTION INTRAVENOUS; SUBCUTANEOUS at 09:00

## 2017-05-20 NOTE — PROGRESS NOTES
~ 1210 - Pt verbalized that his blood sugar felt low when taking lunch time blood sugar. Blood sugar 48. Recheck showed BG of 51. Treated per protocol. Recheck showed BG 96.   notified. Ordered to hold lunch time insulin and will review chart for additional changes. ~ 2000 - Bedside shift change report given to Richard Bourgeois RN (oncoming nurse) by David Heart RN (offgoing nurse). Report included the following information SBAR, Kardex, MAR and Recent Results.

## 2017-05-20 NOTE — PROGRESS NOTES
Patient called out around [phone] saying his sugar felt low and asking for juice. 4oz of Juice was given and tech test sugar. BG was 56 and 8oz of juice was given.  @ 0658. Bedside shift change report given to Alyx Martines RN (oncoming nurse) by Ashley Greene RN (offgoing nurse). Report included the following information SBAR, Kardex, Procedure Summary, Intake/Output, MAR, Accordion, Recent Results, Med Rec Status and Cardiac Rhythm NSR.

## 2017-05-20 NOTE — PROGRESS NOTES
Bedside and Verbal shift change report given to Matthew Javier RN  (oncoming nurse) by Ada Benítez RN  (offgoing nurse). Report included the following information SBAR, Recent Results and Cardiac Rhythm NSR.

## 2017-05-20 NOTE — PROGRESS NOTES
Problem: Falls - Risk of  Goal: *Absence of falls  Outcome: Progressing Towards Goal  Patients bed is in the lowest position and the wheels are locked, call bell is within reach, gripper socks are on, patient has been instructed to call out for assistance if needed, bathroom light is left on during the evening hours. At this time the patient is fall free and will continue to be monitored.

## 2017-05-21 VITALS
HEART RATE: 67 BPM | DIASTOLIC BLOOD PRESSURE: 79 MMHG | HEIGHT: 69 IN | OXYGEN SATURATION: 99 % | SYSTOLIC BLOOD PRESSURE: 127 MMHG | WEIGHT: 165.57 LBS | BODY MASS INDEX: 24.52 KG/M2 | RESPIRATION RATE: 12 BRPM | TEMPERATURE: 98 F

## 2017-05-21 LAB
B-OH-BUTYR SERPL-MCNC: 65 MG/DL
GLUCOSE BLD STRIP.AUTO-MCNC: 105 MG/DL (ref 65–100)
GLUCOSE BLD STRIP.AUTO-MCNC: 138 MG/DL (ref 65–100)
SERVICE CMNT-IMP: ABNORMAL
SERVICE CMNT-IMP: ABNORMAL

## 2017-05-21 PROCEDURE — 74011250636 HC RX REV CODE- 250/636: Performed by: STUDENT IN AN ORGANIZED HEALTH CARE EDUCATION/TRAINING PROGRAM

## 2017-05-21 PROCEDURE — 82962 GLUCOSE BLOOD TEST: CPT

## 2017-05-21 PROCEDURE — 74011636637 HC RX REV CODE- 636/637: Performed by: STUDENT IN AN ORGANIZED HEALTH CARE EDUCATION/TRAINING PROGRAM

## 2017-05-21 PROCEDURE — 74011636637 HC RX REV CODE- 636/637: Performed by: INTERNAL MEDICINE

## 2017-05-21 PROCEDURE — 74011250637 HC RX REV CODE- 250/637: Performed by: FAMILY MEDICINE

## 2017-05-21 RX ADMIN — HUMAN INSULIN 32 UNITS: 100 INJECTION, SUSPENSION SUBCUTANEOUS at 08:06

## 2017-05-21 RX ADMIN — INSULIN LISPRO 8 UNITS: 100 INJECTION, SOLUTION INTRAVENOUS; SUBCUTANEOUS at 09:08

## 2017-05-21 RX ADMIN — ZIPRASIDONE HYDROCHLORIDE 80 MG: 40 CAPSULE ORAL at 09:09

## 2017-05-21 RX ADMIN — INSULIN LISPRO 8 UNITS: 100 INJECTION, SOLUTION INTRAVENOUS; SUBCUTANEOUS at 13:12

## 2017-05-21 RX ADMIN — SODIUM CHLORIDE 125 ML/HR: 900 INJECTION, SOLUTION INTRAVENOUS at 08:05

## 2017-05-21 RX ADMIN — DIVALPROEX SODIUM 500 MG: 500 TABLET, FILM COATED, EXTENDED RELEASE ORAL at 09:09

## 2017-05-21 RX ADMIN — Medication 10 ML: at 14:00

## 2017-05-21 RX ADMIN — BUSPIRONE HYDROCHLORIDE 10 MG: 10 TABLET ORAL at 09:09

## 2017-05-21 RX ADMIN — ASPIRIN 81 MG CHEWABLE TABLET 81 MG: 81 TABLET CHEWABLE at 09:09

## 2017-05-21 RX ADMIN — Medication 10 ML: at 07:33

## 2017-05-21 NOTE — PROGRESS NOTES
Bedside shift change report given to Neil Stratton RN (oncoming nurse) by Hamlet Vidales RN (offgoing nurse). Report included the following information SBAR, Kardex, MAR, Accordion, Recent Results and Cardiac Rhythm NSR.

## 2017-05-21 NOTE — DISCHARGE SUMMARY
Discharge Summary       PATIENT ID: Alexis Martinez  MRN: [id number]   YOB: 1965    DATE OF ADMISSION: 5/18/2017  4:55 PM    DATE OF DISCHARGE: 5/21/2017  PRIMARY CARE PROVIDER: Gómez Jc MD     ATTENDING PHYSICIAN: Lisbet Rossi DO   DISCHARGING PROVIDER: Lisbet Rossi DO    To contact this individual call [phone] and ask the  to page. If unavailable ask to be transferred the Adult Hospitalist Department. CONSULTATIONS: IP CONSULT TO HOSPITALIST    PROCEDURES/SURGERIES: * No surgery found *    ADMITTING [address] [place] [address] COURSE:   1. Diabetic ketoacidosis. Hospital Course: 46year old male with history of DM-1, depression / bipolar disorder, PTSD, HTN presented on 5/18/17 with DKA. He had run out of his insulin because he had not received his disability cheque yet. 45-year-old white male with diabetes mellitus, ran out of his insulin, and presented with diabetic ketoacidosis. Patient states that he was noncompliant to his insulin for only one day. He is on disability, and was awaiting disability benefit checks. Patient denies any dysuria, coughing, congestion, and has not been found to have any objective signs of infection. Patient states that he was diagnosed with diabetes mellitus at the age of 22. He endorses that he has never responded to oral hypoglycemic medications. He endorses that he has type I diabetes mellitus, possibly of late onset). On May 20, 2017, patients blood glucose dropped to 48 around lunchtime. Patient was being given NPH 40 units b.i.d. at that point. His morning NPH was decreased to 32 units QAM, while the evening does remain the same at 40 units. Blood glucose was more stable around 130-140. Patient decided to leave against medical advice the next day. DISCHARGE DIAGNOSES / PLAN:      1. Uncontrolled diabetes mellitus.   At this time, insulin regimen is adjusted to NPH 40 units Q p.m., 32 units QAM. Patient is receiving short acting insulin with Humalog eight units prior to each meal.  2. Bipolar disease. Continue home medications of ziprasidone and Depakote. 3. Anxiety and depression. Continue BuSpar, Depakote, Remeron       Diagnostic test RESULTS:       FOLLOW UP APPOINTMENTS:    Follow-up Information     None           DISCHARGE MEDICATIONS:  Current Discharge Medication List            NOTIFY YOUR PHYSICIAN FOR ANY OF THE FOLLOWING:   Fever over 101 degrees for 24 hours. Chest pain, shortness of breath, fever, chills, nausea, vomiting, diarrhea, change in mentation, falling, weakness, bleeding. Severe pain or pain not relieved by medications. Or, any other signs or symptoms that you may have questions about. DISPOSITION:   X Home With:   OT  PT  HH  RN       Long term SNF/Inpatient Rehab    Independent/assisted living    Hospice    Other:       PATIENT CONDITION AT DISCHARGE:     Functional status    Poor     Deconditioned    X Independent      Cognition   X  Lucid     Forgetful     Dementia      Catheters/lines (plus indication)    Allred     PICC     PEG    X None      Code status   X  Full code     DNR      PHYSICAL EXAMINATION AT DISCHARGE:  Visit Vitals    /79 (BP 1 Location: Right arm, BP Patient Position: At rest;Sitting)    Pulse 67    Temp 98 °F (36.7 °C)    Resp 12    Ht 5' 9\" (1.753 m)    Wt 75.1 kg (165 lb 9.1 oz)    SpO2 99%    BMI 24.45 kg/m2     General: no acute distress. Well developed, well nourished. HEENT: Normocephalic, atraumatic. PERRLA. Anicteric sclerae. Oral mucosa moist.  Neck: Supple, no palpable masses. Chest: Symmetrical excursion. Clear to auscultation bilaterally. Good respiratory effort. Heart: regular rate and rhythm. No murmurs or rubs appreciated. Abdomen: soft, nondistended, nontender. Good bowel sounds in all four quadrants. Extremities: No peripheral edema. No clubbing. No cyanosis. No gross deformities. Skin: No rashes. No jaundice. Warm, dry.   Neurological: cranial nerves 2-12 grossly intact. Alert, oriented x 3. Moves all extremities spontaneously with at least antigravity strength. Psychiatric: normal mood and affect. Lymphatic: no cervical or axillary adenopathy.     CHRONIC MEDICAL DIAGNOSES:  Problem List as of 5/21/2017  Date Reviewed: 5/18/2017          Codes Class Noted - Resolved    * (Principal)DKA (diabetic ketoacidoses) ([place] [address].) ICD-10-CM: E13.10  ICD-9-CM: 250.10  1/22/2017 - Present        C. difficile diarrhea ICD-10-CM: A04.7  ICD-9-CM: 008.45  3/20/2016 - Present        Hyperglycemia due to type 2 diabetes mellitus ([place] [address].) ICD-10-CM: E11.65  ICD-9-CM: 250.00  9/21/2015 - Present        Hyponatremia ICD-10-CM: E87.1  ICD-9-CM: 276.1  9/21/2015 - Present        Nicotine dependence ICD-10-CM: F17.200  ICD-9-CM: 305.1  9/21/2015 - Present        Polysubstance abuse ICD-10-CM: F19.10  ICD-9-CM: 305.90  9/21/2015 - Present        Hypertension ICD-10-CM: I10  ICD-9-CM: 401.9  9/21/2015 - Present        Depressive disorder ICD-10-CM: F32.9  ICD-9-CM: 928  9/21/2015 - Present        Bipolar 1 disorder ([place] [address] ICD-10-CM: F31.9  ICD-9-CM: 296.7  9/21/2015 - Present        IDDM (insulin dependent diabetes mellitus) ([place] [address].) (Chronic) ICD-10-CM: E11.9, Z79.4  ICD-9-CM: 250.00, V58.67  12/3/2014 - Present        Abscess and cellulitis ICD-10-CM: L03.90, L02.91  ICD-9-CM: 682.9  11/30/2014 - Present        RESOLVED: DKA (diabetic ketoacidoses) ([place] [address] ICD-10-CM: E13.10  ICD-9-CM: 250.10  1/14/2015 - 3/22/2016        RESOLVED: DKA (diabetic ketoacidoses) ([facility] ICD-10-CM: E13.10  ICD-9-CM: 250.10  11/19/2013 - 6/21/2014              Signed:   Dolly Mariscal DO  5/21/2017  4:44 PM
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
## 2019-05-28 ENCOUNTER — HOSPITAL ENCOUNTER (OUTPATIENT)
Age: 54
Setting detail: OBSERVATION
Discharge: HOME | End: 2019-05-29
Attending: EMERGENCY MEDICINE | Admitting: INTERNAL MEDICINE
Payer: MEDICARE

## 2019-05-28 DIAGNOSIS — N17.9 AKI (ACUTE KIDNEY INJURY) (HCC): ICD-10-CM

## 2019-05-28 DIAGNOSIS — E10.9 TYPE 1 DIABETES MELLITUS WITHOUT COMPLICATION (HCC): ICD-10-CM

## 2019-05-28 DIAGNOSIS — R73.9 HYPERGLYCEMIA: Primary | ICD-10-CM

## 2019-05-28 LAB
ANION GAP SERPL CALC-SCNC: 9 MMOL/L (ref 5–15)
APPEARANCE UR: CLEAR
BACTERIA URNS QL MICRO: NEGATIVE /HPF
BASOPHILS # BLD: 0 K/UL (ref 0–0.1)
BASOPHILS NFR BLD: 0 % (ref 0–1)
BILIRUB UR QL: NEGATIVE
BUN SERPL-MCNC: 22 MG/DL (ref 6–20)
BUN/CREAT SERPL: 18 (ref 12–20)
CALCIUM SERPL-MCNC: 9.1 MG/DL (ref 8.5–10.1)
CHLORIDE SERPL-SCNC: 95 MMOL/L (ref 97–108)
CO2 SERPL-SCNC: 25 MMOL/L (ref 21–32)
COLOR UR: ABNORMAL
CREAT SERPL-MCNC: 1.22 MG/DL (ref 0.7–1.3)
DIFFERENTIAL METHOD BLD: NORMAL
EOSINOPHIL # BLD: 0.1 K/UL (ref 0–0.4)
EOSINOPHIL NFR BLD: 1 % (ref 0–7)
EPITH CASTS URNS QL MICRO: ABNORMAL /LPF
ERYTHROCYTE [DISTWIDTH] IN BLOOD BY AUTOMATED COUNT: 13.6 % (ref 11.5–14.5)
GLUCOSE BLD STRIP.AUTO-MCNC: 372 MG/DL (ref 65–100)
GLUCOSE BLD STRIP.AUTO-MCNC: 539 MG/DL (ref 65–100)
GLUCOSE BLD STRIP.AUTO-MCNC: 579 MG/DL (ref 65–100)
GLUCOSE SERPL-MCNC: 646 MG/DL (ref 65–100)
GLUCOSE UR STRIP.AUTO-MCNC: >1000 MG/DL
HCT VFR BLD AUTO: 45.1 % (ref 36.6–50.3)
HGB BLD-MCNC: 15.5 G/DL (ref 12.1–17)
HGB UR QL STRIP: NEGATIVE
HYALINE CASTS URNS QL MICRO: ABNORMAL /LPF (ref 0–5)
IMM GRANULOCYTES # BLD AUTO: 0 K/UL (ref 0–0.04)
IMM GRANULOCYTES NFR BLD AUTO: 0 % (ref 0–0.5)
KETONES UR QL STRIP.AUTO: 40 MG/DL
LEUKOCYTE ESTERASE UR QL STRIP.AUTO: NEGATIVE
LYMPHOCYTES # BLD: 1.7 K/UL (ref 0.8–3.5)
LYMPHOCYTES NFR BLD: 23 % (ref 12–49)
MAGNESIUM SERPL-MCNC: 2 MG/DL (ref 1.6–2.4)
MCH RBC QN AUTO: 33 PG (ref 26–34)
MCHC RBC AUTO-ENTMCNC: 34.4 G/DL (ref 30–36.5)
MCV RBC AUTO: 96.2 FL (ref 80–99)
MONOCYTES # BLD: 0.6 K/UL (ref 0–1)
MONOCYTES NFR BLD: 8 % (ref 5–13)
NEUTS SEG # BLD: 4.9 K/UL (ref 1.8–8)
NEUTS SEG NFR BLD: 68 % (ref 32–75)
NITRITE UR QL STRIP.AUTO: NEGATIVE
NRBC # BLD: 0 K/UL (ref 0–0.01)
NRBC BLD-RTO: 0 PER 100 WBC
PH UR STRIP: 6 [PH] (ref 5–8)
PLATELET # BLD AUTO: 313 K/UL (ref 150–400)
PMV BLD AUTO: 10.2 FL (ref 8.9–12.9)
POTASSIUM SERPL-SCNC: 5.1 MMOL/L (ref 3.5–5.1)
PROT UR STRIP-MCNC: NEGATIVE MG/DL
RBC # BLD AUTO: 4.69 M/UL (ref 4.1–5.7)
RBC #/AREA URNS HPF: ABNORMAL /HPF (ref 0–5)
SERVICE CMNT-IMP: ABNORMAL
SODIUM SERPL-SCNC: 129 MMOL/L (ref 136–145)
SP GR UR REFRACTOMETRY: <1.005 (ref 1–1.03)
UR CULT HOLD, URHOLD: NORMAL
UROBILINOGEN UR QL STRIP.AUTO: 0.2 EU/DL (ref 0.2–1)
WBC # BLD AUTO: 7.3 K/UL (ref 4.1–11.1)
WBC URNS QL MICRO: ABNORMAL /HPF (ref 0–4)

## 2019-05-28 PROCEDURE — 96360 HYDRATION IV INFUSION INIT: CPT

## 2019-05-28 PROCEDURE — 96361 HYDRATE IV INFUSION ADD-ON: CPT

## 2019-05-28 PROCEDURE — 81001 URINALYSIS AUTO W/SCOPE: CPT

## 2019-05-28 PROCEDURE — 93005 ELECTROCARDIOGRAM TRACING: CPT

## 2019-05-28 PROCEDURE — 74011636637 HC RX REV CODE- 636/637: Performed by: EMERGENCY MEDICINE

## 2019-05-28 PROCEDURE — 85025 COMPLETE CBC W/AUTO DIFF WBC: CPT

## 2019-05-28 PROCEDURE — 74011636637 HC RX REV CODE- 636/637: Performed by: INTERNAL MEDICINE

## 2019-05-28 PROCEDURE — 65270000029 HC RM PRIVATE

## 2019-05-28 PROCEDURE — 94762 N-INVAS EAR/PLS OXIMTRY CONT: CPT

## 2019-05-28 PROCEDURE — 82962 GLUCOSE BLOOD TEST: CPT

## 2019-05-28 PROCEDURE — 74011250637 HC RX REV CODE- 250/637: Performed by: INTERNAL MEDICINE

## 2019-05-28 PROCEDURE — 99284 EMERGENCY DEPT VISIT MOD MDM: CPT

## 2019-05-28 PROCEDURE — 80048 BASIC METABOLIC PNL TOTAL CA: CPT

## 2019-05-28 PROCEDURE — 99218 HC RM OBSERVATION: CPT

## 2019-05-28 PROCEDURE — 74011250636 HC RX REV CODE- 250/636: Performed by: EMERGENCY MEDICINE

## 2019-05-28 PROCEDURE — 83735 ASSAY OF MAGNESIUM: CPT

## 2019-05-28 PROCEDURE — 36415 COLL VENOUS BLD VENIPUNCTURE: CPT

## 2019-05-28 RX ORDER — MIRTAZAPINE 15 MG/1
15 TABLET, FILM COATED ORAL
Status: DISCONTINUED | OUTPATIENT
Start: 2019-05-28 | End: 2019-05-29 | Stop reason: HOSPADM

## 2019-05-28 RX ORDER — DIVALPROEX SODIUM 500 MG/1
500 TABLET, EXTENDED RELEASE ORAL 3 TIMES DAILY
Status: DISCONTINUED | OUTPATIENT
Start: 2019-05-29 | End: 2019-05-29 | Stop reason: HOSPADM

## 2019-05-28 RX ORDER — ACETAMINOPHEN 325 MG/1
650 TABLET ORAL
Status: ACTIVE | OUTPATIENT
Start: 2019-05-28 | End: 2019-05-29

## 2019-05-28 RX ORDER — GLUCOSAM/CHONDRO/HERB 149/HYAL 750-100 MG
1 TABLET ORAL DAILY
COMMUNITY

## 2019-05-28 RX ORDER — SODIUM CHLORIDE 9 MG/ML
100 INJECTION, SOLUTION INTRAVENOUS CONTINUOUS
Status: DISCONTINUED | OUTPATIENT
Start: 2019-05-28 | End: 2019-05-29 | Stop reason: HOSPADM

## 2019-05-28 RX ORDER — MAGNESIUM SULFATE 100 %
4 CRYSTALS MISCELLANEOUS AS NEEDED
Status: DISCONTINUED | OUTPATIENT
Start: 2019-05-28 | End: 2019-05-29 | Stop reason: HOSPADM

## 2019-05-28 RX ORDER — SODIUM CHLORIDE 0.9 % (FLUSH) 0.9 %
5-40 SYRINGE (ML) INJECTION EVERY 8 HOURS
Status: DISCONTINUED | OUTPATIENT
Start: 2019-05-28 | End: 2019-05-29 | Stop reason: HOSPADM

## 2019-05-28 RX ORDER — SODIUM CHLORIDE 0.9 % (FLUSH) 0.9 %
5-40 SYRINGE (ML) INJECTION AS NEEDED
Status: DISCONTINUED | OUTPATIENT
Start: 2019-05-28 | End: 2019-05-29 | Stop reason: HOSPADM

## 2019-05-28 RX ORDER — BISMUTH SUBSALICYLATE 262 MG
1 TABLET,CHEWABLE ORAL DAILY
COMMUNITY

## 2019-05-28 RX ORDER — ACETAMINOPHEN 325 MG/1
650 TABLET ORAL
Status: DISCONTINUED | OUTPATIENT
Start: 2019-05-28 | End: 2019-05-29 | Stop reason: HOSPADM

## 2019-05-28 RX ORDER — BUPROPION HYDROCHLORIDE 300 MG/1
300 TABLET ORAL DAILY
COMMUNITY

## 2019-05-28 RX ORDER — SIMVASTATIN 20 MG/1
40 TABLET, FILM COATED ORAL
Status: DISCONTINUED | OUTPATIENT
Start: 2019-05-28 | End: 2019-05-29 | Stop reason: HOSPADM

## 2019-05-28 RX ORDER — INSULIN LISPRO 100 [IU]/ML
INJECTION, SOLUTION INTRAVENOUS; SUBCUTANEOUS
Status: DISCONTINUED | OUTPATIENT
Start: 2019-05-28 | End: 2019-05-29 | Stop reason: HOSPADM

## 2019-05-28 RX ORDER — GUAIFENESIN 100 MG/5ML
81 LIQUID (ML) ORAL
Status: DISCONTINUED | OUTPATIENT
Start: 2019-05-28 | End: 2019-05-29 | Stop reason: HOSPADM

## 2019-05-28 RX ORDER — DEXTROSE MONOHYDRATE 25 G/50ML
12.5-25 INJECTION, SOLUTION INTRAVENOUS AS NEEDED
Status: DISCONTINUED | OUTPATIENT
Start: 2019-05-28 | End: 2019-05-29 | Stop reason: HOSPADM

## 2019-05-28 RX ORDER — THERA TABS 400 MCG
1 TAB ORAL DAILY
Status: DISCONTINUED | OUTPATIENT
Start: 2019-05-29 | End: 2019-05-29 | Stop reason: HOSPADM

## 2019-05-28 RX ORDER — SIMVASTATIN 40 MG/1
40 TABLET, FILM COATED ORAL
COMMUNITY

## 2019-05-28 RX ORDER — BUTALBITAL, ACETAMINOPHEN AND CAFFEINE 50; 325; 40 MG/1; MG/1; MG/1
1 TABLET ORAL
Status: DISCONTINUED | OUTPATIENT
Start: 2019-05-28 | End: 2019-05-28

## 2019-05-28 RX ORDER — ENOXAPARIN SODIUM 100 MG/ML
40 INJECTION SUBCUTANEOUS EVERY 24 HOURS
Status: DISCONTINUED | OUTPATIENT
Start: 2019-05-28 | End: 2019-05-29 | Stop reason: HOSPADM

## 2019-05-28 RX ORDER — GLUCOSAM/CHONDRO/HERB 149/HYAL 750-100 MG
1 TABLET ORAL DAILY
Status: DISCONTINUED | OUTPATIENT
Start: 2019-05-29 | End: 2019-05-29 | Stop reason: HOSPADM

## 2019-05-28 RX ORDER — ZIPRASIDONE HYDROCHLORIDE 20 MG/1
80 CAPSULE ORAL 2 TIMES DAILY WITH MEALS
Status: DISCONTINUED | OUTPATIENT
Start: 2019-05-28 | End: 2019-05-29 | Stop reason: HOSPADM

## 2019-05-28 RX ORDER — NALOXONE HYDROCHLORIDE 0.4 MG/ML
0.4 INJECTION, SOLUTION INTRAMUSCULAR; INTRAVENOUS; SUBCUTANEOUS AS NEEDED
Status: DISCONTINUED | OUTPATIENT
Start: 2019-05-28 | End: 2019-05-29 | Stop reason: HOSPADM

## 2019-05-28 RX ORDER — BUPROPION HYDROCHLORIDE 150 MG/1
300 TABLET ORAL DAILY
Status: DISCONTINUED | OUTPATIENT
Start: 2019-05-29 | End: 2019-05-29 | Stop reason: HOSPADM

## 2019-05-28 RX ORDER — MIRTAZAPINE 15 MG/1
15 TABLET, FILM COATED ORAL
COMMUNITY

## 2019-05-28 RX ADMIN — INSULIN HUMAN 40 UNITS: 100 INJECTION, SUSPENSION SUBCUTANEOUS at 22:48

## 2019-05-28 RX ADMIN — INSULIN HUMAN 7 UNITS: 100 INJECTION, SOLUTION PARENTERAL at 21:47

## 2019-05-28 RX ADMIN — SODIUM CHLORIDE 1000 ML: 900 INJECTION, SOLUTION INTRAVENOUS at 20:14

## 2019-05-28 RX ADMIN — BUTALBITAL, ACETAMINOPHEN, AND CAFFEINE 1 TABLET: 50; 325; 40 TABLET ORAL at 22:37

## 2019-05-28 NOTE — ED TRIAGE NOTES
Pt. States ran out of insulin about 24 hours ago, states feels like in DKA, nausea vomiting headache.

## 2019-05-28 NOTE — ED PROVIDER NOTES
Juan Narvaez is a 47 y.o. male who presents ambulatory to the ED with a c/o n/v, HA, diaphoresis, today. Pt currently rates his headache at 8/10 in severity. Pt reports that he is a Type 1 diabetic and he ran out of insulin yesterday. Pt notes that his last dose was 30 units of Novalog N yesterday morning. His usual regiment includes 60 unites of novalin in morning, 50 units at night, sliding scale at meal time. Pt reports that he was unable to refill his insulin due to monetary issues. Pt states that he has been in DKA before and was admitted 3 months ago for DKA. He notes that he tried taking Tylenol extra strength but got no relief. Pt specifically denies chest pain, shortness of breath, diarrhea , fever, chills, numbness, tingling, abdominal pain, back pain, cough, leg swelling, dizziness or any other acute sx. Pt denies any recent travel, known sick contacts, or recent illness. PCP: Dai Sagastume MD  PMHx significant for: DM ( Type I), Depression, PTSD, Bipolar Disorder, HTN, DKA  PSHx significant for: Appendectomy  Social Hx: Tobacco: Current every day smoker EtOH: occaisional Illicit drug use: none    There are no further complaints or symptoms at this time. Signed by: Sacha Noguera, scribing for Robbie Mantilla MD  on May 28th, 2019 at 8:00pm.              The history is provided by the patient. No  was used. Past Medical History:   Diagnosis Date    Bipolar 1 disorder ([facility]     Depressive disorder     Diabetes ([facility]     Type 1    Hypertension     Psychiatric disorder     bipolar    PTSD (post-traumatic stress disorder)     PTSD (post-traumatic stress disorder)        Past Surgical History:   Procedure Laterality Date    HX APPENDECTOMY           No family history on file.     Social History     Socioeconomic History    Marital status:      Spouse name: Not on file    Number of children: Not on file    Years of education: Not on file    Highest education level: Not on file   Occupational History    Not on file   Social Needs    Financial resource strain: Not on file    Food insecurity:     Worry: Not on file     Inability: Not on file    Transportation needs:     Medical: Not on file     Non-medical: Not on file   Tobacco Use    Smoking status: Current Every Day Smoker     Packs/day: 1.00     Years: 0.00     Pack years: 0.00   Substance and Sexual Activity    Alcohol use: No     Comment: occasion    Drug use: Yes     Types: Heroin     Comment: Uses Heroin everyday    Sexual activity: Not on file   Lifestyle    Physical activity:     Days per week: Not on file     Minutes per session: Not on file    Stress: Not on file   Relationships    Social connections:     Talks on phone: Not on file     Gets together: Not on file     Attends Mandaeism service: Not on file     Active member of club or organization: Not on file     Attends meetings of clubs or organizations: Not on file     Relationship status: Not on file    Intimate partner violence:     Fear of current or ex partner: Not on file     Emotionally abused: Not on file     Physically abused: Not on file     Forced sexual activity: Not on file   Other Topics Concern    Not on file   Social History Narrative    Not on file         ALLERGIES: Patient has no known allergies. Review of Systems   Constitutional: Positive for diaphoresis. Negative for chills and fever. Respiratory: Negative for cough and shortness of breath. Cardiovascular: Negative for chest pain. Gastrointestinal: Positive for nausea and vomiting. Negative for abdominal pain and diarrhea. Neurological: Positive for headaches. All other systems reviewed and are negative. There were no vitals filed for this visit. Physical Exam   Constitutional: He is oriented to person, place, and time. He appears well-developed and well-nourished. HENT:   Head: Normocephalic and atraumatic.    Eyes: Pupils are equal, round, and reactive to light. Conjunctivae and EOM are normal.   Neck: Normal range of motion. Cardiovascular: Normal rate, regular rhythm, normal heart sounds and intact distal pulses. No murmur heard. Pulmonary/Chest: Effort normal and breath sounds normal. No stridor. No respiratory distress. He has no wheezes. He has no rales. Abdominal: Soft. Bowel sounds are normal. He exhibits no distension and no mass. There is no tenderness. Musculoskeletal: Normal range of motion. Neurological: He is alert and oriented to person, place, and time. No cranial nerve deficit. Coordination normal.   Skin: Skin is warm and dry. Nursing note and vitals reviewed. MDM  Number of Diagnoses or Management Options  Hyperglycemia:   Diagnosis management comments: Ning Sella appears from elevated blood sugar per pt hx. No need for head ct at this time. eval for DKA as out of insulin. Likely nidus        Amount and/or Complexity of Data Reviewed  Clinical lab tests: ordered and reviewed  Discuss the patient with other providers: yes (hospitalist)  Independent visualization of images, tracings, or specimens: yes (ekg)    Patient Progress  Patient progress: stable         Procedures    ED EKG interpretation:  Rhythm: normal sinus rhythm; and regular . Rate (approx.): 70; Axis: left axis deviation; P wave: normal; QRS interval: normal ; ST/T wave: non-specific changes  EKG documented by Jose Treviño MD, scribe, as interpreted by Bianka Freire MD, ED MD.      Currently not in dka but brittle diabetic. Unable to get meds filled until Friday.  Spoke with Dr Sneha Guajardo the hospitalist who will admit

## 2019-05-29 VITALS
HEART RATE: 60 BPM | DIASTOLIC BLOOD PRESSURE: 57 MMHG | RESPIRATION RATE: 18 BRPM | SYSTOLIC BLOOD PRESSURE: 107 MMHG | TEMPERATURE: 97.9 F | WEIGHT: 150 LBS | OXYGEN SATURATION: 96 % | BODY MASS INDEX: 22.22 KG/M2 | HEIGHT: 69 IN

## 2019-05-29 LAB
ALBUMIN SERPL-MCNC: 3.1 G/DL (ref 3.5–5)
ALBUMIN/GLOB SERPL: 1.1 {RATIO} (ref 1.1–2.2)
ALP SERPL-CCNC: 51 U/L (ref 45–117)
ALT SERPL-CCNC: 20 U/L (ref 12–78)
ANION GAP SERPL CALC-SCNC: 2 MMOL/L (ref 5–15)
AST SERPL-CCNC: 11 U/L (ref 15–37)
ATRIAL RATE: 68 BPM
BASOPHILS # BLD: 0 K/UL (ref 0–0.1)
BASOPHILS NFR BLD: 1 % (ref 0–1)
BILIRUB SERPL-MCNC: 0.5 MG/DL (ref 0.2–1)
BUN SERPL-MCNC: 16 MG/DL (ref 6–20)
BUN/CREAT SERPL: 22 (ref 12–20)
CALCIUM SERPL-MCNC: 8.1 MG/DL (ref 8.5–10.1)
CALCULATED P AXIS, ECG09: 55 DEGREES
CALCULATED R AXIS, ECG10: -94 DEGREES
CALCULATED T AXIS, ECG11: 44 DEGREES
CHLORIDE SERPL-SCNC: 104 MMOL/L (ref 97–108)
CO2 SERPL-SCNC: 30 MMOL/L (ref 21–32)
CREAT SERPL-MCNC: 0.73 MG/DL (ref 0.7–1.3)
DIAGNOSIS, 93000: NORMAL
DIFFERENTIAL METHOD BLD: ABNORMAL
EOSINOPHIL # BLD: 0.2 K/UL (ref 0–0.4)
EOSINOPHIL NFR BLD: 2 % (ref 0–7)
ERYTHROCYTE [DISTWIDTH] IN BLOOD BY AUTOMATED COUNT: 13.3 % (ref 11.5–14.5)
EST. AVERAGE GLUCOSE BLD GHB EST-MCNC: 286 MG/DL
GLOBULIN SER CALC-MCNC: 2.7 G/DL (ref 2–4)
GLUCOSE BLD STRIP.AUTO-MCNC: 189 MG/DL (ref 65–100)
GLUCOSE BLD STRIP.AUTO-MCNC: 190 MG/DL (ref 65–100)
GLUCOSE BLD STRIP.AUTO-MCNC: 352 MG/DL (ref 65–100)
GLUCOSE SERPL-MCNC: 196 MG/DL (ref 65–100)
HBA1C MFR BLD: 11.6 % (ref 4.2–6.3)
HCT VFR BLD AUTO: 37 % (ref 36.6–50.3)
HGB BLD-MCNC: 12.8 G/DL (ref 12.1–17)
IMM GRANULOCYTES # BLD AUTO: 0 K/UL (ref 0–0.04)
IMM GRANULOCYTES NFR BLD AUTO: 0 % (ref 0–0.5)
LYMPHOCYTES # BLD: 3.6 K/UL (ref 0.8–3.5)
LYMPHOCYTES NFR BLD: 48 % (ref 12–49)
MAGNESIUM SERPL-MCNC: 2 MG/DL (ref 1.6–2.4)
MCH RBC QN AUTO: 32.4 PG (ref 26–34)
MCHC RBC AUTO-ENTMCNC: 34.6 G/DL (ref 30–36.5)
MCV RBC AUTO: 93.7 FL (ref 80–99)
MONOCYTES # BLD: 0.6 K/UL (ref 0–1)
MONOCYTES NFR BLD: 8 % (ref 5–13)
NEUTS SEG # BLD: 3.1 K/UL (ref 1.8–8)
NEUTS SEG NFR BLD: 41 % (ref 32–75)
NRBC # BLD: 0 K/UL (ref 0–0.01)
NRBC BLD-RTO: 0 PER 100 WBC
P-R INTERVAL, ECG05: 144 MS
PHOSPHATE SERPL-MCNC: 3.4 MG/DL (ref 2.6–4.7)
PLATELET # BLD AUTO: 229 K/UL (ref 150–400)
PMV BLD AUTO: 9.5 FL (ref 8.9–12.9)
POTASSIUM SERPL-SCNC: 4 MMOL/L (ref 3.5–5.1)
PROT SERPL-MCNC: 5.8 G/DL (ref 6.4–8.2)
Q-T INTERVAL, ECG07: 404 MS
QRS DURATION, ECG06: 84 MS
QTC CALCULATION (BEZET), ECG08: 429 MS
RBC # BLD AUTO: 3.95 M/UL (ref 4.1–5.7)
SERVICE CMNT-IMP: ABNORMAL
SODIUM SERPL-SCNC: 136 MMOL/L (ref 136–145)
VENTRICULAR RATE, ECG03: 68 BPM
WBC # BLD AUTO: 7.5 K/UL (ref 4.1–11.1)

## 2019-05-29 PROCEDURE — 96372 THER/PROPH/DIAG INJ SC/IM: CPT

## 2019-05-29 PROCEDURE — 82962 GLUCOSE BLOOD TEST: CPT

## 2019-05-29 PROCEDURE — 84100 ASSAY OF PHOSPHORUS: CPT

## 2019-05-29 PROCEDURE — 83735 ASSAY OF MAGNESIUM: CPT

## 2019-05-29 PROCEDURE — 74011636637 HC RX REV CODE- 636/637: Performed by: INTERNAL MEDICINE

## 2019-05-29 PROCEDURE — 83036 HEMOGLOBIN GLYCOSYLATED A1C: CPT

## 2019-05-29 PROCEDURE — 80053 COMPREHEN METABOLIC PANEL: CPT

## 2019-05-29 PROCEDURE — 74011250636 HC RX REV CODE- 250/636: Performed by: INTERNAL MEDICINE

## 2019-05-29 PROCEDURE — 85025 COMPLETE CBC W/AUTO DIFF WBC: CPT

## 2019-05-29 PROCEDURE — 96361 HYDRATE IV INFUSION ADD-ON: CPT

## 2019-05-29 PROCEDURE — 99218 HC RM OBSERVATION: CPT

## 2019-05-29 PROCEDURE — 74011250637 HC RX REV CODE- 250/637: Performed by: INTERNAL MEDICINE

## 2019-05-29 PROCEDURE — 36415 COLL VENOUS BLD VENIPUNCTURE: CPT

## 2019-05-29 RX ADMIN — ASPIRIN 81 MG 81 MG: 81 TABLET ORAL at 00:55

## 2019-05-29 RX ADMIN — ZIPRASIDONE HYDROCHLORIDE 80 MG: 20 CAPSULE ORAL at 00:55

## 2019-05-29 RX ADMIN — ENOXAPARIN SODIUM 40 MG: 40 INJECTION SUBCUTANEOUS at 00:54

## 2019-05-29 RX ADMIN — ZIPRASIDONE HYDROCHLORIDE 80 MG: 20 CAPSULE ORAL at 08:40

## 2019-05-29 RX ADMIN — DIVALPROEX SODIUM 500 MG: 500 TABLET, EXTENDED RELEASE ORAL at 08:40

## 2019-05-29 RX ADMIN — THERA TABS 1 TABLET: TAB at 08:40

## 2019-05-29 RX ADMIN — MIRTAZAPINE 15 MG: 15 TABLET, FILM COATED ORAL at 00:55

## 2019-05-29 RX ADMIN — Medication 10 ML: at 00:55

## 2019-05-29 RX ADMIN — SIMVASTATIN 40 MG: 20 TABLET, FILM COATED ORAL at 00:55

## 2019-05-29 RX ADMIN — INSULIN HUMAN 60 UNITS: 100 INJECTION, SUSPENSION SUBCUTANEOUS at 08:39

## 2019-05-29 RX ADMIN — BUPROPION HYDROCHLORIDE 300 MG: 150 TABLET, FILM COATED, EXTENDED RELEASE ORAL at 08:40

## 2019-05-29 RX ADMIN — INSULIN LISPRO 3 UNITS: 100 INJECTION, SOLUTION INTRAVENOUS; SUBCUTANEOUS at 08:39

## 2019-05-29 RX ADMIN — SODIUM CHLORIDE 100 ML/HR: 900 INJECTION, SOLUTION INTRAVENOUS at 01:03

## 2019-05-29 RX ADMIN — OMEGA-3 FATTY ACIDS CAP 1000 MG 1 CAPSULE: 1000 CAP at 08:40

## 2019-05-29 NOTE — PROGRESS NOTES
Handed off report to James Horn RN at bedside using SBAR. No complaints or signs of distress noted at this time. No needs stated. Callbell in reach, patient is orientated x4 and ad moises. Will pass off other into in report.

## 2019-05-29 NOTE — PROGRESS NOTES
5/29/2019 8:53 AM Case management consult for \"may need financial assistance with medications\". Met with pt. Pt confirmed he has medication coverage through his insurance, [employer]. Pt reported he usually uses [facility] [address] but uses [facility] for his diabetic medications. Pt confirmed his brother is going to loan him money to cover his medications, cost is $50. Pt confirmed he has refills on his medications and will not need new scripts. Pt was independent with adls and driving prior to admission. No discharge needs identified.  [facility]  Care Management Interventions  PCP Verified by CM: Blanco Harris, no nurse navigator )  Transition of Care Consult (CM Consult): Discharge Planning  MyChart Signup: No  Discharge Durable Medical Equipment: No  Physical Therapy Consult: No  Occupational Therapy Consult: No  Speech Therapy Consult: No  Discharge Location  Discharge Placement: Home with family assistance

## 2019-05-29 NOTE — DISCHARGE INSTRUCTIONS
HOSPITALIST DISCHARGE INSTRUCTIONS  NAME: Loretta Rosario   :  1965   MRN:  [id number]     Date/Time:  2019 8:57 AM    ADMIT DATE: 2019     DISCHARGE DATE: 2019     ADMITTING DIAGNOSIS:  Hyperglycemia, type 2 diabetes, ran out of insulin    DISCHARGE DIAGNOSIS:  same    MEDICATIONS:  See after visit summary       · It is important that you take the medication exactly as they are prescribed. · Keep your medication in the bottles provided by the pharmacist and keep a list of the medication names, dosages, and times to be taken in your wallet. · Do not take other medications without consulting your doctor     Pain Management: per above medications    What to do at Home    Recommended diet:  Diabetic Diet    Recommended activity: Activity as tolerated    1) Return to the hospital if you feel worse    2) If you experience any of the following symptoms then please call your primary care physician or return to the emergency room if you cannot get hold of your doctor:  Fever, chills, nausea, vomiting, diarrhea, change in mentation, falling, bleeding, shortness of breath, chest pain, severe headache, severe abdominal pain,     3) Resume your home insulin. Follow up with your doctors    Follow Up: Follow-up Information     Follow up With Specialties Details Why Contact Info    Brendon Fong MD [facility] Schedule an appointment as soon as possible for a visit in 1 week  [address] [place] [address]  [phone]              Information obtained by :  I understand that if any problems occur once I am at home I am to contact my physician. I understand and acknowledge receipt of the instructions indicated above.                                                                                                                                            Physician's or R.N.'s Signature                                                                  Date/Time Patient or Representative Signature                                                          Date/Time    Patient Education        Type 2 Diabetes: Care Instructions  Your Care Instructions    Type 2 diabetes is a disease that develops when the body's tissues cannot use insulin properly. Over time, the pancreas cannot make enough insulin. Insulin is a hormone that helps the body's cells use sugar (glucose) for energy. It also helps the body store extra sugar in muscle, fat, and liver cells. Without insulin, the sugar cannot get into the cells to do its work. It stays in the blood instead. This can cause high blood sugar levels. A person has diabetes when the blood sugar stays too high too much of the time. Over time, diabetes can lead to diseases of the heart, blood vessels, nerves, kidneys, and eyes. You may be able to control your blood sugar by losing weight, eating a healthy diet, and getting daily exercise. You may also have to take insulin or other diabetes medicine. Follow-up care is a key part of your treatment and safety. Be sure to make and go to all appointments. Call your doctor if you are having problems. It's also a good idea to know your test results and keep a list of the medicines you take. How can you care for yourself at home? · Keep your blood sugar at a target level (which you set with your doctor). ? Eat a good diet that spreads carbohydrate throughout the day. Carbohydrate--the body's main source of fuel--affects blood sugar more than any other nutrient. Carbohydrate is in fruits, vegetables, milk, and yogurt. It also is in breads, cereals, vegetables such as potatoes and corn, and sugary foods such as candy and cakes. ? Aim for 30 minutes of exercise on most, preferably all, days of the week. Walking is a good choice.  You also may want to do other activities, such as running, swimming, cycling, or playing tennis or team sports. If your doctor says it's okay, do muscle-strengthening exercises at least 2 times a week. ? Take your medicines exactly as prescribed. Call your doctor if you think you are having a problem with your medicine. You will get more details on the specific medicines your doctor prescribes. · Check your blood sugar as often as your doctor recommends. It is important to keep track of any symptoms you have, such as low blood sugar. Also tell your doctor if you have any changes in your activities, diet, or insulin use. · Talk to your doctor before you start taking aspirin every day. Aspirin can help certain people lower their risk of a heart attack or stroke. But taking aspirin isn't right for everyone, because it can cause serious bleeding. · Do not smoke. If you need help quitting, talk to your doctor about stop-smoking programs and medicines. These can increase your chances of quitting for good. · Keep your cholesterol and blood pressure at normal levels. You may need to take one or more medicines to reach your goals. Take them exactly as directed. Do not stop or change a medicine without talking to your doctor first.  When should you call for help? Call 911 anytime you think you may need emergency care. For example, call if:    · You passed out (lost consciousness), or you suddenly become very sleepy or confused. (You may have very low blood sugar.)    Call your doctor now or seek immediate medical care if:    · Your blood sugar is 300 mg/dL or is higher than the level your doctor has set for you.     · You have symptoms of low blood sugar, such as:  ? Sweating. ? Feeling nervous, shaky, and weak. ? Extreme hunger and slight nausea. ? Dizziness and headache.  ? Blurred vision. ?  Confusion.    Watch closely for changes in your health, and be sure to contact your doctor if:    · You often have problems controlling your blood sugar.     · You have symptoms of long-term diabetes problems, such as:  ? New vision changes. ? New pain, numbness, or tingling in your hands or feet. ? Skin problems. Where can you learn more? Go to http://annel-rahul.info/. Enter C553 in the search box to learn more about \"Type 2 Diabetes: Care Instructions. \"  Current as of: July 25, 2018  Content Version: 11.9  © 9564-1830 [employer]. Care instructions adapted under license by [employer] (which disclaims liability or warranty for this information). If you have questions about a medical condition or this instruction, always ask your healthcare professional. [address] any warranty or liability for your use of this information.

## 2019-05-29 NOTE — DIABETES MGMT
DTC planned to see patient this morning due to elevated A1C, however, noted that patient left prior to nurse giving discharge papers.   Patient admitted at 2136 last night and left at 0910 this am.  Brent Tamez MS, RN, CDE

## 2019-05-29 NOTE — DISCHARGE SUMMARY
[facility] [address]  [address] [place], [place], [address] [place] [address]  [phone]    Physician Discharge Summary     Patient ID:  Joanne Deleon  [id number]  47 y.o.  1965    Admit date: 5/28/2019    Discharge date and time: 5/29/2019    Admission Diagnoses: Hyperglycemia due to type 2 diabetes mellitus ([address] [E11.65]    Discharge Diagnoses:  Principal Diagnosis Hyperglycemia due to type 2 diabetes mellitus ([address]                                            Principal Problem:    Hyperglycemia due to type 2 diabetes mellitus ([address] (9/21/2015)    Active Problems:    IDDM (insulin dependent diabetes mellitus) ([address] (12/3/2014)      Hyponatremia (9/21/2015)      Hypertension (9/21/2015)      Depressive disorder (9/21/2015)      Bipolar 1 disorder ([address] (9/21/2015)      WAQAR (acute kidney injury) ([address] (5/28/2019)           Hospital Course:     48 yo hx of DM, bipolar, presented w/ severe hyperglycemia from running out of insulin    1) Hyperglycemia due to type 2 diabetes mellitus / IDDM (insulin dependent diabetes mellitus): A1C 11.6%. Severe hyperglycemia in ED, due to having run out of meds 2/2 financial difficulties. No DKA or HONK. Patient improved with IVF, restarting home insulin, dose NPH (60 units QAM and 40 unit QPM). Patient stated that he will borrow money from his brother for insulin. Declined case management help on discharge     2) Renal insuff: due to hyperglycemia. Now resolved      3) Hyponatremia: pseudohyponatremia 2/2 hyperglycemia. Now resolved     4) Hypertension: controlled. Continue ASA, lisinopril.      5) Depressive disorder / Bipolar 1 disorder: on numerous psychotropics to include Geodon, depakote, Wellbutrin, Remeron.  outpatient follow up      PCP: Lisa Rene MD     Consults: None    Significant Diagnostic Studies: none    Discharge Exam:  Physical Exam:    Gen: Well-developed, well-nourished, in no acute distress  HEENT:  Pink conjunctivae, PERRL, hearing intact to voice, moist mucous membranes  Neck: Supple, without masses, thyroid non-tender  Resp: No accessory muscle use, clear breath sounds without wheezes rales or rhonchi  Card: No murmurs, normal S1, S2 without thrills, bruits or peripheral edema  Abd:  Soft, non-tender, non-distended, normoactive bowel sounds are present  Lymph:  No cervical or inguinal adenopathy  Musc: No cyanosis or clubbing  Skin: No rashes   Neuro:  Cranial nerves are grossly intact, no focal motor weakness, follows commands appropriately  Psych:  Good insight, oriented to person, place and time, alert    Disposition: home  Discharge Condition: Stable    Patient Instructions:   Current Discharge Medication List      CONTINUE these medications which have NOT CHANGED    Details   insulin regular (NOVOLIN R, HUMULIN R) 100 unit/mL injection 10-15 Units by SubCUTAneous route Before breakfast, lunch, and dinner. !! insulin NPH (NOVOLIN N, HUMULIN N) 100 unit/mL injection 40 Units by SubCUTAneous route Daily (before dinner). mirtazapine (REMERON) 15 mg tablet Take 15 mg by mouth nightly. buPROPion XL (WELLBUTRIN XL) 300 mg XL tablet Take 300 mg by mouth daily. simvastatin (ZOCOR) 40 mg tablet Take 40 mg by mouth nightly. omega 3-DHA-EPA-fish oil 1,000 mg (120 mg-180 mg) capsule Take 1 Cap by mouth daily. multivitamin (ONE A DAY) tablet Take 1 Tab by mouth daily. potassium 99 mg tablet Take 99 mg by mouth daily. !! insulin NPH (NOVOLIN N, HUMULIN N) 100 unit/mL injection 60 Units by SubCUTAneous route Daily (before breakfast). Indications: diabetes      divalproex ER (DEPAKOTE ER) 500 mg ER tablet Take 500 mg by mouth three (3) times daily. lisinopril (PRINIVIL, ZESTRIL) 20 mg tablet Take 20 mg by mouth nightly. aspirin 81 mg chewable tablet Take 81 mg by mouth nightly. ziprasidone (GEODON) 80 mg capsule Take 80 mg by mouth two (2) times daily (with meals).        !! - Potential duplicate medications found. Please discuss with provider.         Activity: Activity as tolerated  Diet: Diabetic Diet  Wound Care: None needed    Follow-up with  Follow-up Information     Follow up With Specialties Details Why Contact Info    Zeferino Barba MD [facility] Schedule an appointment as soon as possible for a visit in 1 week  [address]  [phone]            Follow-up tests/labs: blood sugar    Signed:  Del Paige MD  5/29/2019  8:59 AM    I spent 31 min on discharge

## 2019-05-29 NOTE — ED NOTES
Telephonic report was given to [employer].   SBAR, MAR, Labs,Vital Signs, and Plan discussed

## 2019-05-29 NOTE — PROGRESS NOTES
Patient left the hospital after being seen by Dr Magui Coombs. Dr Magui Coombs spoke to him about discharge. Nurse completed all paper work and went to patients room at [phone] to discharge him.  Patient had left before being seen and receiving papers from nurse

## 2019-05-29 NOTE — PROGRESS NOTES
[facility]: MED RECONCILIATION    Comments/Recommendations:   Patient reports some compliance issues, especially regarding his insulin, d/t cost. He does report compliance with other current maintenance medications however. He prefers [address] [place] [address] for most prescriptions, however he gets his insulin from [facility] because it is much cheaper. Insulin - Because of the cost of insulin, patient reports that he tries to stretch out his insulin use as much as possible so that he is able to afford his other medications. He is prescribed NPH 60 units in the morning and 40 units in the evening, but in an attempt to save money patient will often give less insulin. Last took 30 units on 5/27/19 in the morning. He is also prescribed Humalog 12-15 units TID AC, however this costs him over $100 a bottle. So he gets regular insulin instead from [facility] for only $25 a bottle. He is also able to get his NPH from there as well for the same price.      Medications added:     Regular insulin 10-15 units TID AC (uses this instead of Humalog d/t cost)  Bupropion  mg PO daily  Simvastatin 40 mg PO QHS  Fish oil daily  MVI daily  OTC potassium supplement daily    Medications removed:    Humalog insulin 12-15 units TID AC (uses regular insulin from [facility] d/t cost)  Buspirone 10 mg PO BID - patient reports that he is no longer taking this  Hydroxyzine pamoate 25 mg PO TID    Medications adjusted:    ASA 81 mg PO QHS (instead of daily)  Lisinopril 20 mg PO QHS (instead of daily)  Mirtazapine 15 mg PO QHS (instead of 30 mg)    Information obtained from: patient, Rx query    Significant PMH/Disease States:   Past Medical History:   Diagnosis Date    Bipolar 1 disorder ([facility]     Depressive disorder     Diabetes ([facility]     Type 1    Hypertension     Psychiatric disorder     bipolar    PTSD (post-traumatic stress disorder)     PTSD (post-traumatic stress disorder)      Chief Complaint for this Admission:   Chief Complaint   Patient presents with    High Blood Sugar     Allergies: Patient has no known allergies. Prior to Admission Medications:     Medication Documentation Review Audit       Reviewed by HANS CisnerosD (Pharmacist) on 05/28/19 at 2212      Medication Sig Documenting Provider Last Dose Status Taking?   aspirin 81 mg chewable tablet Take 81 mg by mouth nightly. Provider, Historical 5/27/2019 pm Active Yes   buPROPion XL (WELLBUTRIN XL) 300 mg XL tablet Take 300 mg by mouth daily. Provider, Historical 5/28/2019 am Active Yes   divalproex ER (DEPAKOTE ER) 500 mg ER tablet Take 500 mg by mouth three (3) times daily. Provider, Historical 5/28/2019 am Active Yes   insulin NPH (NOVOLIN N, HUMULIN N) 100 unit/mL injection 60 Units by SubCUTAneous route Daily (before breakfast). Indications: diabetes Provider, Historical 5/27/2019 am Active Yes           Med Note Tobi Adkins, [address] [place] [address] May 28, 2019 10:08 PM) Prescribed NPH 60 units in the morning and 40 units in the evening, but in an attempt to save money, patient will often give less insulin. Last took 30 units on 5/27/19 in the morning. insulin NPH (NOVOLIN N, HUMULIN N) 100 unit/mL injection 40 Units by SubCUTAneous route Daily (before dinner). Provider, Historical 5/26/2019 pm Active Yes           Med Note Tobi Adkins, [address] [place] [address] May 28, 2019 10:12 PM) Prescribed NPH 60 units in the morning and 40 units in the evening, but in an attempt to save money, patient will often give less insulin. Last took 30 units on 5/27/19 in the morning. insulin regular (NOVOLIN R, HUMULIN R) 100 unit/mL injection 10-15 Units by SubCUTAneous route Before breakfast, lunch, and dinner. Provider, Historical 5/26/2019 Active Yes   lisinopril (PRINIVIL, ZESTRIL) 20 mg tablet Take 20 mg by mouth nightly. Provider, Historical 5/27/2019 pm Active Yes   mirtazapine (REMERON) 15 mg tablet Take 15 mg by mouth nightly.  Provider, Historical 5/27/2019 pm Active Yes   multivitamin (ONE A DAY) tablet Take 1 Tab by mouth daily. Provider, Historical 5/28/2019 am Active Yes   omega 3-DHA-EPA-fish oil 1,000 mg (120 mg-180 mg) capsule Take 1 Cap by mouth daily. Provider, Historical 5/28/2019 am Active Yes   potassium 99 mg tablet Take 99 mg by mouth daily. Provider, Historical 5/28/2019 am Active Yes   simvastatin (ZOCOR) 40 mg tablet Take 40 mg by mouth nightly. Provider, Historical 5/27/2019 pm Active Yes   ziprasidone (GEODON) 80 mg capsule Take 80 mg by mouth two (2) times daily (with meals). Provider, Historical 5/28/2019 am Active Yes                  Thank you for the consult,  Roger Fuentes D, [address]

## 2019-05-29 NOTE — H&P
[facility]  [address] [facility], [facility]  [phone]    Hospitalist Admission Note      NAME:  Sadie Guerra   :   1965   MRN:  [id number]     PCP:  Leida Tipton MD     Date/Time:  2019 10:30 PM         Assessment / Plan:         Hyperglycemia due to type 2 diabetes mellitus / IDDM (insulin dependent diabetes mellitus): severe hyperglycemia in ED, due to having run out of meds 2/2 financial difficulties. No DKA or HONK, but certainly at high risk for development. Given IV insulin in the ED. Start home dose NPH (60 units QAM and 40 unit QPM). High dose sliding scale insulin. Check A1c. CM consult for financial aid. WAQAR (acute kidney injury): mild, 2/2 severe hyperglycemia. Check UA. Follow BMP       Hyponatremia: pseudohyponatremia 2/2 hyperglycemia. Watch on IV NS. Hypertension: controlled. Continue ASA, lisinopril. Depressive disorder / Bipolar 1 disorder: on numerous psychotropics to include Geodon, depakote, Wellbutrin, Remeron. outpatient follow up       Code Status: FULL     Surrogate decision maker: father      ED notes and lab results reviewed. Total time spent with patient: 50 Minutes   Time spent in the care of this patient included reviewing records, discussing with nursing, obtaining history and examining the patient, and discussing treatment plans, with >50% time spent counseling/coordinating care    Risk of deterioration: High                 Care Plan discussed with: ED provider, Patient, Nursing Staff and >50% of time spent in counseling and coordination of care    Discussed:  Care Plan and D/C Planning    Prophylaxis:  Lovenox    Disposition:  Home w/Family                 Subjective:     CHIEF COMPLAINT: HA, not feeling well    HISTORY OF PRESENT ILLNESS:     Mr. Hurtado is a 47 y.o. male w/ hx of IDDM DM, HTN, bipolar d/w who presents with headache.  Started today, moderate to severe, thought it was related to his BG as he ran out of his insulin at home 2/2 financial hardship. Symptoms associated with polydipsia/polyuria, mild blurred vision, numbness. No fevers or chills. ED workup showed . No acidosis on BMP. Mr. Hurtado is admitted for further evaluation and management. Past Medical History:   Diagnosis Date    B[facility] 1 disorder ([facility]     Depressive disorder     Diabetes ([facility]     Type 1    Hypertension     Psychiatric disorder     bipolar    PTSD (post-traumatic stress disorder)     PTSD (post-traumatic stress disorder)         Past Surgical History:   Procedure Laterality Date    HX APPENDECTOMY         Social History     Tobacco Use    Smoking status: Current Every Day Smoker     Packs/day: 1.00     Years: 0.00     Pack years: 0.00   Substance Use Topics    Alcohol use: No     Comment: occasion         Family History: No family history of DM      No Known Allergies     Prior to Admission medications    Medication Sig Start Date End Date Taking? Authorizing Provider   insulin regular (NOVOLIN R, HUMULIN R) 100 unit/mL injection 10-15 Units by SubCUTAneous route Before breakfast, lunch, and dinner. Yes Provider, Historical   insulin NPH (NOVOLIN N, HUMULIN N) 100 unit/mL injection 40 Units by SubCUTAneous route Daily (before dinner). Yes Provider, Historical   mirtazapine (REMERON) 15 mg tablet Take 15 mg by mouth nightly. Yes Provider, Historical   buPROPion XL (WELLBUTRIN XL) 300 mg XL tablet Take 300 mg by mouth daily. Yes Provider, Historical   simvastatin (ZOCOR) 40 mg tablet Take 40 mg by mouth nightly. Yes Provider, Historical   omega 3-DHA-EPA-fish oil 1,000 mg (120 mg-180 mg) capsule Take 1 Cap by mouth daily. Yes Provider, Historical   multivitamin (ONE A DAY) tablet Take 1 Tab by mouth daily. Yes Provider, Historical   potassium 99 mg tablet Take 99 mg by mouth daily.    Yes Provider, Historical   insulin NPH (NOVOLIN N, HUMULIN N) 100 unit/mL injection 60 Units by SubCUTAneous route Daily (before breakfast). Indications: diabetes   Yes Provider, Historical   divalproex ER (DEPAKOTE ER) 500 mg ER tablet Take 500 mg by mouth three (3) times daily. Yes Provider, Historical   lisinopril (PRINIVIL, ZESTRIL) 20 mg tablet Take 20 mg by mouth nightly. Yes Provider, Historical   aspirin 81 mg chewable tablet Take 81 mg by mouth nightly. Yes Provider, Historical   ziprasidone (GEODON) 80 mg capsule Take 80 mg by mouth two (2) times daily (with meals). Yes Provider, Historical       Review of Systems:  (bold if positive, if negative)    Gen:  fatigueEyes:  ENT:  CVS:  Pulm:  GI:    :    MS:  Skin:  Psych:  Endo:  polyuria  Hem:  Renal:    Neuro:  headache          Objective:      VITALS:    Vital signs reviewed; most recent are:    Visit Vitals  /73   Pulse 67   Temp 98.6 °F (37 °C)   Resp 16   Ht 5' 9\" (1.753 m)   Wt 68 kg (150 lb)   SpO2 99%   BMI 22.15 kg/m²     SpO2 Readings from Last 6 Encounters:   05/28/19 99%   05/21/17 99%   01/24/17 95%   11/09/16 97%   03/22/16 98%   01/09/16 100%        No intake or output data in the 24 hours ending 05/28/19 2230         Exam:     Physical Exam:    Gen:  Chronically ill-appearing. NAD  HEENT:  No scleral icterus, PERRL, hearing intact to voice, moist mucous membranes  Neck:  Supple, without masses. Thyroid non-tender. No nuchal rigidity  Resp:  No accessory muscle use. CTAB without wheezing, rales, rhonchi  Card: RRR. Normal S1 and S2 without murmurs, rubs, or gallops. No peripheral lower extremity edema. No JVD. Peripheral pulses in tact. Abd:  Normoactive bowel sounds. Soft, non-tender, non-distended. No rebound, no guarding. No appreciable hepatosplenomegaly   Lymph:  No cervical adenopathy  Musc:  No cyanosis or clubbing  Skin:  No rashes or ulcers; turgor intact. Neuro:  Cranial nerves are grossly intact, no focal motor weakness, follows commands appropriately  Psych:  Good insight, normal affect. Alert, oriented x 3. Answers questions appropriately       Labs:    Recent Labs     05/28/19 2003   WBC 7.3   HGB 15.5   HCT 45.1        Recent Labs     05/28/19 2003   *   K 5.1   CL 95*   CO2 25   *   BUN 22*   CREA 1.22   CA 9.1   MG 2.0     No components found for: GLPOC  No results for input(s): PH, PCO2, PO2, HCO3, FIO2 in the last 72 hours. No results for input(s): INR in the last 72 hours. No lab exists for component: INREXT  Lab Results   Component Value Date/Time    Specimen Description: NASAL 05/04/2014 01:36 PM    Specimen Description: URINE 05/04/2014 07:48 AM    Specimen Description: BLOOD 05/04/2014 07:02 AM     Lab Results   Component Value Date/Time    Culture result: NO GROWTH 5 DAYS 03/19/2016 12:28 PM    Culture result:  03/19/2016 12:14 PM     NO ROUTINE ENTERIC PATHOGENS ISOLATED INCLUDING SALMONELLA, SHIGELLA, YERSINIA, VIBRIO OR SHIGA TOXIN PRODUCING E. COLI    Culture result:  03/19/2016 12:14 PM     MODERATE  MIXED GRAM POSITIVE SAMMY  WITH YEAST  (PREDOMINATING)      Culture result: MRSA NOT PRESENT 03/19/2016 12:14 PM    Culture result:  03/19/2016 12:14 PM         Screening of patient nares for MRSA is for surveillance purposes and, if positive, to facilitate isolation considerations in high risk settings. It is not intended for automatic decolonization interventions per se as regimens are not sufficiently effective to warrant routine use. All other current labs reviewed in the computer.       Imaging/Studies:    EKG: NSR, no overt ST-T changes  EKG personally reviewed    ___________________________________________________    Attending Physician: Jason Roman MD

## 2019-10-14 ENCOUNTER — HOSPITAL ENCOUNTER (EMERGENCY)
Age: 54
Discharge: LEFT BEFORE DISCHARGE | End: 2019-10-14
Attending: EMERGENCY MEDICINE | Admitting: EMERGENCY MEDICINE
Payer: MEDICARE

## 2019-10-14 ENCOUNTER — HOSPITAL ENCOUNTER (EMERGENCY)
Age: 54
Discharge: LEFT BEFORE DISCHARGE | End: 2019-10-14
Attending: EMERGENCY MEDICINE | Admitting: EMERGENCY MEDICINE
Payer: MEDICARE

## 2019-10-14 VITALS
OXYGEN SATURATION: 98 % | BODY MASS INDEX: 22.2 KG/M2 | SYSTOLIC BLOOD PRESSURE: 118 MMHG | TEMPERATURE: 98.2 F | WEIGHT: 149.91 LBS | HEART RATE: 117 BPM | HEIGHT: 69 IN | DIASTOLIC BLOOD PRESSURE: 81 MMHG

## 2019-10-14 VITALS — OXYGEN SATURATION: 96 %

## 2019-10-14 DIAGNOSIS — F32.89 OTHER DEPRESSION: Primary | ICD-10-CM

## 2019-10-14 PROCEDURE — 75810000275 HC EMERGENCY DEPT VISIT NO LEVEL OF CARE

## 2019-10-14 PROCEDURE — 99283 EMERGENCY DEPT VISIT LOW MDM: CPT

## 2019-10-14 NOTE — ED TRIAGE NOTES
Triage: Pt to ED via EMS due to mental health concerns over SI and other mental health concerns. Pt has stated extensive alcohol use today. On initial arrival, Pt inquires if he is TDO, Pt informed he is not, but attempt to inquire on what brought Pt to ED he continued to ask he was under at TDO? Pt became verbally aggressive and stated he wanted to leave, Pt stated he would arrange a ride home. Pt returned a few mins later to ED requesting help.
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
## 2019-10-15 NOTE — ED NOTES
Pt found to not be sitting in chair, Pt not located in ED or waiting area.  Pt to be taking off board as eloped

## 2020-01-04 ENCOUNTER — APPOINTMENT (OUTPATIENT)
Dept: GENERAL RADIOLOGY | Age: 55
End: 2020-01-04
Attending: STUDENT IN AN ORGANIZED HEALTH CARE EDUCATION/TRAINING PROGRAM
Payer: MEDICARE

## 2020-01-04 ENCOUNTER — HOSPITAL ENCOUNTER (EMERGENCY)
Age: 55
Discharge: HOME | End: 2020-01-04
Attending: STUDENT IN AN ORGANIZED HEALTH CARE EDUCATION/TRAINING PROGRAM
Payer: MEDICARE

## 2020-01-04 VITALS
HEART RATE: 102 BPM | OXYGEN SATURATION: 99 % | HEIGHT: 69 IN | WEIGHT: 150 LBS | TEMPERATURE: 99.2 F | BODY MASS INDEX: 22.22 KG/M2 | SYSTOLIC BLOOD PRESSURE: 159 MMHG | DIASTOLIC BLOOD PRESSURE: 78 MMHG | RESPIRATION RATE: 14 BRPM

## 2020-01-04 DIAGNOSIS — L02.91 ABSCESS: Primary | ICD-10-CM

## 2020-01-04 DIAGNOSIS — L03.113 CELLULITIS OF RIGHT UPPER EXTREMITY: ICD-10-CM

## 2020-01-04 LAB
ANION GAP SERPL CALC-SCNC: 7 MMOL/L (ref 5–15)
BASOPHILS # BLD: 0 K/UL (ref 0–0.1)
BASOPHILS NFR BLD: 0 % (ref 0–1)
BUN SERPL-MCNC: 9 MG/DL (ref 6–20)
BUN/CREAT SERPL: 11 (ref 12–20)
CALCIUM SERPL-MCNC: 8.8 MG/DL (ref 8.5–10.1)
CHLORIDE SERPL-SCNC: 92 MMOL/L (ref 97–108)
CO2 SERPL-SCNC: 29 MMOL/L (ref 21–32)
COMMENT, HOLDF: NORMAL
CREAT SERPL-MCNC: 0.83 MG/DL (ref 0.7–1.3)
DIFFERENTIAL METHOD BLD: ABNORMAL
EOSINOPHIL # BLD: 0 K/UL (ref 0–0.4)
EOSINOPHIL NFR BLD: 0 % (ref 0–7)
ERYTHROCYTE [DISTWIDTH] IN BLOOD BY AUTOMATED COUNT: 13.2 % (ref 11.5–14.5)
GLUCOSE SERPL-MCNC: 572 MG/DL (ref 65–100)
HCT VFR BLD AUTO: 37.7 % (ref 36.6–50.3)
HGB BLD-MCNC: 13.3 G/DL (ref 12.1–17)
IMM GRANULOCYTES # BLD AUTO: 0 K/UL (ref 0–0.04)
IMM GRANULOCYTES NFR BLD AUTO: 0 % (ref 0–0.5)
LYMPHOCYTES # BLD: 1.4 K/UL (ref 0.8–3.5)
LYMPHOCYTES NFR BLD: 13 % (ref 12–49)
MCH RBC QN AUTO: 32.7 PG (ref 26–34)
MCHC RBC AUTO-ENTMCNC: 35.3 G/DL (ref 30–36.5)
MCV RBC AUTO: 92.6 FL (ref 80–99)
MONOCYTES # BLD: 1.1 K/UL (ref 0–1)
MONOCYTES NFR BLD: 10 % (ref 5–13)
NEUTS SEG # BLD: 8.2 K/UL (ref 1.8–8)
NEUTS SEG NFR BLD: 77 % (ref 32–75)
NRBC # BLD: 0 K/UL (ref 0–0.01)
NRBC BLD-RTO: 0 PER 100 WBC
PLATELET # BLD AUTO: 341 K/UL (ref 150–400)
PMV BLD AUTO: 9.4 FL (ref 8.9–12.9)
POTASSIUM SERPL-SCNC: 4.4 MMOL/L (ref 3.5–5.1)
RBC # BLD AUTO: 4.07 M/UL (ref 4.1–5.7)
SAMPLES BEING HELD,HOLD: NORMAL
SODIUM SERPL-SCNC: 128 MMOL/L (ref 136–145)
WBC # BLD AUTO: 10.7 K/UL (ref 4.1–11.1)

## 2020-01-04 PROCEDURE — 74011250637 HC RX REV CODE- 250/637: Performed by: STUDENT IN AN ORGANIZED HEALTH CARE EDUCATION/TRAINING PROGRAM

## 2020-01-04 PROCEDURE — 80048 BASIC METABOLIC PNL TOTAL CA: CPT

## 2020-01-04 PROCEDURE — 99283 EMERGENCY DEPT VISIT LOW MDM: CPT

## 2020-01-04 PROCEDURE — 73080 X-RAY EXAM OF ELBOW: CPT

## 2020-01-04 PROCEDURE — 75810000289 HC I&D ABSCESS SIMP/COMP/MULT

## 2020-01-04 PROCEDURE — 74011636637 HC RX REV CODE- 636/637: Performed by: STUDENT IN AN ORGANIZED HEALTH CARE EDUCATION/TRAINING PROGRAM

## 2020-01-04 PROCEDURE — 85025 COMPLETE CBC W/AUTO DIFF WBC: CPT

## 2020-01-04 PROCEDURE — 74011000250 HC RX REV CODE- 250: Performed by: STUDENT IN AN ORGANIZED HEALTH CARE EDUCATION/TRAINING PROGRAM

## 2020-01-04 PROCEDURE — 36415 COLL VENOUS BLD VENIPUNCTURE: CPT

## 2020-01-04 RX ORDER — CEPHALEXIN 500 MG/1
500 CAPSULE ORAL 4 TIMES DAILY
Qty: 28 CAP | Refills: 0 | Status: SHIPPED | OUTPATIENT
Start: 2020-01-04 | End: 2020-01-11

## 2020-01-04 RX ORDER — DOXYCYCLINE HYCLATE 100 MG
100 TABLET ORAL 2 TIMES DAILY
Qty: 14 TAB | Refills: 0 | Status: SHIPPED | OUTPATIENT
Start: 2020-01-04 | End: 2020-01-11

## 2020-01-04 RX ORDER — NAPROXEN 500 MG/1
500 TABLET ORAL 2 TIMES DAILY WITH MEALS
Qty: 20 TAB | Refills: 0 | Status: SHIPPED | OUTPATIENT
Start: 2020-01-04 | End: 2020-01-14

## 2020-01-04 RX ORDER — SODIUM CHLORIDE 9 MG/ML
1000 INJECTION, SOLUTION INTRAVENOUS ONCE
Status: DISCONTINUED | OUTPATIENT
Start: 2020-01-04 | End: 2020-01-04 | Stop reason: HOSPADM

## 2020-01-04 RX ORDER — CEPHALEXIN 250 MG/1
500 CAPSULE ORAL
Status: COMPLETED | OUTPATIENT
Start: 2020-01-04 | End: 2020-01-04

## 2020-01-04 RX ORDER — CEPHALEXIN 500 MG/1
500 CAPSULE ORAL 4 TIMES DAILY
Qty: 28 CAP | Refills: 0 | Status: SHIPPED | OUTPATIENT
Start: 2020-01-04 | End: 2020-01-04 | Stop reason: SDUPTHER

## 2020-01-04 RX ORDER — DOXYCYCLINE HYCLATE 100 MG
100 TABLET ORAL
Status: COMPLETED | OUTPATIENT
Start: 2020-01-04 | End: 2020-01-04

## 2020-01-04 RX ORDER — LIDOCAINE HYDROCHLORIDE AND EPINEPHRINE 10; 10 MG/ML; UG/ML
1.5 INJECTION, SOLUTION INFILTRATION; PERINEURAL
Status: COMPLETED | OUTPATIENT
Start: 2020-01-04 | End: 2020-01-04

## 2020-01-04 RX ADMIN — HUMAN INSULIN 10 UNITS: 100 INJECTION, SOLUTION SUBCUTANEOUS at 16:04

## 2020-01-04 RX ADMIN — DOXYCYCLINE HYCLATE 100 MG: 100 TABLET, COATED ORAL at 15:33

## 2020-01-04 RX ADMIN — LIDOCAINE HYDROCHLORIDE,EPINEPHRINE BITARTRATE 15 MG: 10; .01 INJECTION, SOLUTION INFILTRATION; PERINEURAL at 14:46

## 2020-01-04 RX ADMIN — CEPHALEXIN 500 MG: 250 CAPSULE ORAL at 15:33

## 2020-01-04 NOTE — ED TRIAGE NOTES
Pt here for right arm abcess. Pt states, \" I stayed at a cheap motel and woke up with bug bites and this one wont go away\". Large red area with blister noted to right elbow crease.  Redness down to wrist

## 2020-01-04 NOTE — DISCHARGE INSTRUCTIONS
PLEASE KEEP A CLOSE EYE ON YOUR WOUND(S). IF YOU NOTICE RED STREAKING, INCREASING DRAINAGE, WORSENING REDNESS, OR FEVER THEN PLEASE RETURN IMMEDIATELY.      IF ANYTHING CHANGES OR IF YOU CHANGE YOUR MIND AND ARE ABLE TO BE ADMITTED PLEASE RETURN     AVOID DIRECT SUNLIGHT WHILE TAKING THE DOXYCYCLINE

## 2020-01-04 NOTE — ED PROVIDER NOTES
I have evaluated the patient as the Provider in Triage. I have reviewed His vital signs and the triage nurse assessment. I have talked with the patient and any available family and advised that I am the provider in triage and have ordered the appropriate study to initiate their work up based on the clinical presentation during my assessment. I have advised that the patient will be accommodated in the Main ED as soon as possible. I have also requested to contact the triage nurse or myself immediately if the patient experiences any changes in their condition during this brief waiting period. Note written by Alice Moseley, as dictated by Antonio Jimenez PA-C 2:09 PM    --------------------------------------------------    47 y.o. male with past medical history significant for T1 DM, HTN, PTSD, bipolar type 1 who presents via personal vehicle with chief complaint of skin problem. Pt states that he stayed in a cheap motel and woke up covered in bed bites the next morning. Has a bed bug bite over the right [facility] [place] that now appears infected. Reports the area surrounding the bite in the R [facility] [place] is mildly painful, swollen, and erythematous. Pain to the area is worse with palpation. Tried taking advil with no relief. Hx of RUE surgery and has a crease in the skin of right AC that is unrelated to current event. There are no other acute medical concerns at this time. Surgical hx - appendectomy   Social hx - Tobacco use: daily smoker (1 pack/day), Alcohol Use: occasionally, Drug Use: daily heroin user     PCP: None    Note written by Alice Armijo, as dictated by Ayan Ballard DO 2:35PM    The history is provided by the patient. No  was used.         Past Medical History:   Diagnosis Date    Bipolar 1 disorder ([facility]     Depressive disorder     Diabetes ([facility]     Type 1    Hypertension     Psychiatric disorder     bipolar    PTSD (post-traumatic stress disorder)     PTSD (post-traumatic stress disorder)        Past Surgical History:   Procedure Laterality Date    HX APPENDECTOMY           No family history on file. Social History     Socioeconomic History    Marital status:      Spouse name: Not on file    Number of children: Not on file    Years of education: Not on file    Highest education level: Not on file   Occupational History    Not on file   Social Needs    Financial resource strain: Not on file    Food insecurity:     Worry: Not on file     Inability: Not on file    Transportation needs:     Medical: Not on file     Non-medical: Not on file   Tobacco Use    Smoking status: Current Every Day Smoker     Packs/day: 1.00     Years: 0.00     Pack years: 0.00   Substance and Sexual Activity    Alcohol use: No     Comment: occasion    Drug use: Yes     Types: Heroin     Comment: Uses Heroin everyday    Sexual activity: Not on file   Lifestyle    Physical activity:     Days per week: Not on file     Minutes per session: Not on file    Stress: Not on file   Relationships    Social connections:     Talks on phone: Not on file     Gets together: Not on file     Attends Tenri[employer] service: Not on file     Active member of club or organization: Not on file     Attends meetings of clubs or organizations: Not on file     Relationship status: Not on file    Intimate partner violence:     Fear of current or ex partner: Not on file     Emotionally abused: Not on file     Physically abused: Not on file     Forced sexual activity: Not on file   Other Topics Concern    Not on file   Social History Narrative    Not on file         ALLERGIES: Patient has no known allergies. Review of Systems   Constitutional: Negative for chills and fever. HENT: Negative for congestion and sore throat. Eyes: Negative for pain and redness. Respiratory: Negative for cough and shortness of breath. Cardiovascular: Negative for chest pain and palpitations. Gastrointestinal: Negative for abdominal pain, diarrhea, nausea and vomiting. Genitourinary: Negative for frequency and hematuria. Musculoskeletal: Positive for myalgias. Negative for back pain and neck pain. Skin: Positive for color change and wound. Neurological: Negative for dizziness and headaches. Hematological: Does not bruise/bleed easily. All other systems reviewed and are negative. Vitals:    01/04/20 1404 01/04/20 1430 01/04/20 1638   BP: 159/78     Pulse: (!) 102     Resp: 14     Temp: 99.2 °F (37.3 °C)     SpO2: 98% 98% 99%   Weight: 68 kg (150 lb)     Height: 5' 9\" (1.753 m)              Physical Exam  Vitals signs and nursing note reviewed. Constitutional:       General: He is not in acute distress. Appearance: He is well-developed. HENT:      Head: Normocephalic and atraumatic. Eyes:      Conjunctiva/sclera: Conjunctivae normal.      Pupils: Pupils are equal, round, and reactive to light. Neck:      Musculoskeletal: Normal range of motion and neck supple. Cardiovascular:      Rate and Rhythm: Normal rate and regular rhythm. Heart sounds: Normal heart sounds. No murmur. No friction rub. No gallop. Pulmonary:      Effort: Pulmonary effort is normal. No respiratory distress. Breath sounds: Normal breath sounds. No wheezing or rales. Abdominal:      General: Bowel sounds are normal. There is no distension. Palpations: Abdomen is soft. Tenderness: There is no tenderness. There is no guarding or rebound. Musculoskeletal: Normal range of motion. Skin:     General: Skin is warm and dry. Capillary Refill: Capillary refill takes less than 2 seconds. Comments: 3cm x 3cm area of induration in right AC w/ 1cm x 1cm area of fluctuance w/ erythema streaking down the right forearm    Neurological:      Mental Status: He is alert and oriented to person, place, and time.         Note written by Alice Nuno, as dictated by José Duran DO 2:35PM          Labs Reviewed:   No leukocytosis  Hyperglycemic  No AG or metabolic acidosis      Imaging Reviewed:   XR R forearm without obvious pathology      Course:  Pt states that he does not wish to be admitted for IV abx stating that he has something to do later and needs to go. Was informed of the risks of leaving but still stated he does not want admission. Pt denies any fever. Oral doxy and keflex given    Insulin 10U SQ given, pt refused fluids    Procedure Note - Incision and Drainage:   Performed by José Duran DO .     Verbal consent was obtained. Immediately prior to the procedure, the patient was reevaluated and found suitable for the planned procedure and any planned medications. Immediately prior to the procedure a time out was called to verify the correct patient, procedure, equipment, staff, and marking as appropriate. The site prepped with ChloraPrep. Anesthesia was obtained via local infiltration with 1% lidocaine and with epinephrine. A 1 cm incision was made using a #11 scalpel blade to incise the abscess cavity. Moderate amount of purulent drainage was expressed. A packing was not placed. The procedure was tolerated well. MDM:  49-year-old male history of insulin-dependent diabetes here with abscess to right forearm with cellulitis of the right forearm. I&D was performed as above. Patient given first dose of oral abx. Given hx of diabetes and associated cellulitis I encouraged the patient to be admitted for IV abx however he refused. I explained to him he was at risk of losing the arm, sepsis, and even death however he is still adamant about going home. He was given rx for keflex and doxy with very strict return precautions. I advised him to return immediately if any change or if he changed his mind and would accept admission. I advised him to at the very least be re-evaluated on Monday for a wound check.             Clinical Impression:     ICD-10-CM ICD-9-CM    1. Abscess L02.91 682.9    2. Cellulitis of right upper extremity L03.113 682. 3            Disposition: DAMIEN Duran DO

## 2021-10-05 ENCOUNTER — TELEPHONE (OUTPATIENT)
Dept: PHARMACY | Age: 56
End: 2021-10-05

## 2021-10-05 NOTE — TELEPHONE ENCOUNTER
CLINICAL PHARMACY: ADHERENCE REVIEW  Identified care gap per Johnathon; fills at [facility]: ACE/ARB and Statin adherence    PCP in [facility] listed as none. No information available in chart since ED visit 1/4/2020. ASSESSMENT  ACE/ARB ADHERENCE    Per Insurance Records through 9/19/21 (2020 [facility] = <80%; YTD PDC = 81%; Potential Fail Date: 10/10/21):   Lisinopril 20 mg tablets last filled on 6/7/21 for 90 day supply. Next refill due: 9/5/21. Prescriber: Roberta Douglas, endocrinology    Per [facility]:   Lisinopril 20 mg tablets last picked up on 6/7/21 for 90 day supply. 0 refills remaining. Billed through [employer]. Faxing refill request    BP Readings from Last 3 Encounters:   01/04/20 159/78   10/14/19 118/81   05/29/19 107/57     CrCl cannot be calculated (Patient's most recent lab result is older than the maximum 180 days allowed. ). DIABETES     PCP listed as none. No information available in chart since ED visit 1/4/2020    Lab Results   Component Value Date/Time    Hemoglobin A1c 11.6 (H) 05/29/2019 05:04 AM    Hemoglobin A1c 11.0 (H) 05/19/2017 03:38 AM    Hemoglobin A1c 10.7 (H) 05/18/2017 09:48 PM     STATIN ADHERENCE    Per Insurance Records through 9/19/21 (2020 [facility] = <80%; YTD PDC = 91%; Potential Fail Date: 11/16/21): Simvastatin 20 mg tablet last filled on 7/8/21 for 90 day supply. Next refill due: 10/6/21. Prescriber: Roberta Douglas, endocrinology    Per [facility]:   Simvastatin 20 mg tablet last picked up on 7/10/21 for 90 day supply. 2 refills remaining. Billed through [employer]. Refilling.     Lab Results   Component Value Date/Time    Cholesterol, total 105 03/19/2016 08:23 AM    HDL Cholesterol 32 03/19/2016 08:23 AM    LDL, calculated 56.6 03/19/2016 08:23 AM    VLDL, calculated 16.4 03/19/2016 08:23 AM    Triglyceride 82 03/19/2016 08:23 AM    CHOL/HDL Ratio 3.3 03/19/2016 08:23 AM     ALT (SGPT)   Date Value Ref Range Status   05/29/2019 20 12 - 78 U/L Final     AST (SGOT)   Date Value Ref Range Status   05/29/2019 11 (L) 15 - 37 U/L Final     The ASCVD Risk score (Doemnic Cross, et al., 2013) failed to calculate for the following reasons:    Cannot find a previous HDL lab    Cannot find a previous total cholesterol lab     PLAN  The following are interventions that have been identified:  - Patient overdue refilling simvastatin and lisinopril and active on home medication list. Simvastatin ready for  at pharmacy. - Patient needs refills for lisinopril. Pharmacy sent fax request to provider. Attempting to reach patient to review.  Left message asking for return call. No future appointments.     Christine Villa, RaphaelD, [facility], [address]  Toll free: [phone], option 2

## 2021-10-05 NOTE — LETTER
[address]  [address], [address]        [address] [place] [phone]           10/06/21     Dear Mary Dumont,    We tried to reach you recently regarding your simvastatin and lisinopril, but were unable to reach you on the telephone. If you are no longer taking or taking differently than prescribed, please call us at the number below so that we can discuss this and update your medication profile. It appears that this medication has not been filled at proper times. We are worried you might be missing doses or not taking it as directed. It is important that you take your medications regularly and try not to miss a single dose.     Some ways to help you remember to take and refill your medications are to:  · Use a pill box, set an alarm, and/or keep your medication near something that you do every day  · Fill a 3-month supply of your prescription at a time to save you time and trips to the pharmacy  if you would like assistance in switching your prescriptions to a 3-month supply, please contact us  · Ask your pharmacy if they participate in [facility]", a program where you can  all of your medications on the same day  · Ask your pharmacy if you can be set up with automatic refill, where they will automatically refill your prescription when it is due and let you know it's ready to     Sincerely,   [address] [place] [address] free: [phone], option 2

## 2021-10-06 NOTE — TELEPHONE ENCOUNTER
Second attempt made to contact patient. Left voice message to return call to [phone], option 2. Letter sent.     Virgie Iqbal, PharmD, [facility], [address]  Toll free: [phone], option 2    For Pharmacy [address] [place] [address] Road in place: No   Gap Closed?: No   Time Spent (min): 10

## 2023-03-20 NOTE — DIABETES MGMT
DTC Insulin Drip   Progress Note     Recommendations/ Comments:  Patient on the Insulin drip for 23 hours. Drip rate 3.7. Has required 46.4 units insulin over last 6 hours. - Attempted to see pt to assess home management due to elevated A1c but pt was sleeping. Will return tomorrow. Patient will require basal insulin 2 hours prior to discontinuing the drip. Patient calculates by weight to require 28 (0.4units/kgBW/day) units of basal insulin. Pt's home dose of basal insulin is 40 units of NPH BID. Chart reviewed on [facility]. Patient is 46 y.o. male with PMHx Type 1 Diabetes on intensive insulin injection program at home. A1c:   Lab Results   Component Value Date/Time    Hemoglobin A1c 12.5 01/23/2017 02:44 AM         Recent Glucose Results: Lab Results   Component Value Date/Time     (H) 01/23/2017 11:25 AM     (H) 01/23/2017 07:03 AM     (H) 01/23/2017 03:04 AM    GLUCPOC 152 (H) 01/23/2017 04:37 PM    GLUCPOC 147 (H) 01/23/2017 03:33 PM    GLUCPOC 174 (H) 01/23/2017 02:26 PM        Lab Results   Component Value Date/Time    Creatinine 0.84 01/23/2017 11:25 AM       Active Orders   Diet    DIET DIABETIC CONSISTENT CARB Regular        PO intake: Patient Vitals for the past 72 hrs:   % Diet Eaten   01/23/17 1400 100 %   01/23/17 1017 100 %       Currently on insulin gtt. Will continue to follow as needed. Thank you.   Abby Og RD No

## 2025-07-31 NOTE — PROGRESS NOTES
Hospitalist Progress Note  Mee Nicholson   Office: [phone]      Date of Service:  2017  NAME:  Sammie Kehr  :  1965  MRN:  [id number]      Admission Summary:   46year old male with history of DM-1, depression / bipolar disorder, PTSD, HTN presented on 17 with DKA. He had run out of his insulin because he had not received his disability check yet. 59-year-old white male with diabetes mellitus, ran out of his insulin, and presented with diabetic ketoacidosis. Patient states that he was noncompliant to his insulin for only one day. He is on disability, and was awaiting disability benefit checks. Patient denies any dysuria, coughing, congestion, and has not been found to have any objective signs of infection. Patient states that he was diagnosed with diabetes mellitus at the age of 22. He endorses that he has never responded to oral hypoglycemic medications. He endorses that he has type I diabetes mellitus, possibly of late onset). Interval history / Subjective:    Overnight events: Patients blood glucose dropped to 48 around lunchtime today. He was treated per protocol. Patient was asymptomatic without any tremor, sweating, dizziness, mental status changes. Patient has been tolerating diet well. NPH is reduced from 40 units QAM to 32 units QAM, keeping dinnertime dose at 40 units as before. Patients blood glucose on this mornings metabolic panel was 614. No abdominal pain, nausea, vomiting. Assessment & Plan:     Assessment/plan:  1. Uncontrolled diabetes mellitus. Difficult to verify whether this is type I or type II. At this time, insulin regimen is adjusted to NPH 40 units Q p.m., 32 units QAM. Patient is receiving short acting insulin with Humalog eight units prior to each meal -- this may also have to be reduced to five units. Will continue to monitor blood glucose. 2. Bipolar disease. Patient will be picked up from emergency department at 0630.    Have you had a sleep study done before? Not to caregiver's knowledge    Are you using PAP?No    Do you live in an assisted living facility, group home, or nursing home?skilled nursing facility    Are you independent with daily living activities? No, standby assist for dressing and transfer     Do you need assistance using the restroom? Yes, stand-by assist, will use walker in room    Do you have a history of bowel or urinary incontinence? If yes, do you need a bedside commode or bed pan? No    Do you walk with assisted devices, such as walker, cane, wheelchair or scooter? WC, walker    Will you need any assistance walking to the Sleep Lab? WC to lab, tech  from ED.    Have you had any recent falls? No    Are you able to move independently in and out of bed? Standby assist    Are you able to sleep in a traditional bed? Yes    Are you on supplemental oxygen? No    Do you have any medical conditions that we should be aware of?No    Bed Time : 9-10 PM  Wake Time:   7-8 AM  Medication Reconciliation: Completed    Patient advised to-     - take medications prior to arrival. If patient will need to take medications at the sleep center, medications must be in prescription bottles and tech must be notified.    - call and let us know if there are any medical changes that would require assistance with walking or daily activities that happen between now and scheduled appointment.    - avoid napping the day of sleep study appointment, to avoid caffeine after the noon hour on the day of the sleep study, and should bring something comfortable to sleep in.       Patient told to enter through the Emergency Department approximately 15 minutes prior to their appt time.        Continue home medications of ziprasidone and Depakote. 3. Anxiety and depression. Continue BuSpar, Depakote, Remeron    Code status: FULL  DVT prophylaxis: SCDs    Care Plan discussed with: Patient/Family and Nurse  Disposition: Home w/Family      Hospital Problems  Date Reviewed: 5/18/2017          Codes Class Noted POA    * (Principal)DKA (diabetic ketoacidoses) ([facility] [address] [facility] ICD-10-CM: E13.10  ICD-9-CM: 250.10  1/22/2017 Unknown                Review of Systems:   A comprehensive review of systems was negative except for that written in the HPI. Vital Signs:    Last 24hrs VS reviewed since prior progress note. Most recent are:  Patient Vitals for the past 24 hrs:   Temp Pulse Resp BP SpO2   05/20/17 1537 98.7 °F (37.1 °C) 69 18 135/56 97 %           Physical Examination:             Constitutional:  No acute distress, cooperative, pleasant    ENT:  Oral mucous moist, oropharynx benign. Neck supple,    Resp:  CTA bilaterally. No wheezing/rhonchi/rales. No accessory muscle use   CV:  Regular rhythm, normal rate, no murmurs, gallops, rubs    GI:  Soft, non distended, non tender. normoactive bowel sounds, no hepatosplenomegaly     Musculoskeletal:  No edema, warm, 2+ pulses throughout    Neurologic:  Moves all extremities.   AAOx3, CN II-XII reviewed            Data Review:    Review and/or order of clinical lab test      Labs:     Recent Labs      05/20/17   0445  05/19/17   0338   WBC  6.1  9.7   HGB  13.4  14.3   HCT  38.3  40.7   PLT  243  271     Recent Labs      05/20/17   0445  05/19/17   0809  05/19/17   0338  05/18/17   2325   NA  141  132*  135*  132*   K  3.8  4.5  4.8  4.4   CL  108  103  106  105   CO2  25  17*  19*  20*   BUN  11  12  12  14   CREA  0.66*  0.78  0.77  0.78   GLU  111*  301*  211*  155*   CA  7.9*  7.8*  7.6*  7.9*   MG   --   1.5*  1.6  1.7   PHOS   --    --    --   3.1     Recent Labs      05/18/17   1607   SGOT  13*   ALT  22   AP  83   TBILI  0.9   TP  7.5   ALB  4.1   GLOB  3.4     No results for input(s): INR, PTP, APTT in the last 72 hours. No lab exists for component: INREXT, INREXT   No results for input(s): FE, TIBC, PSAT, FERR in the last 72 hours. No results found for: FOL, RBCF   No results for input(s): PH, PCO2, PO2 in the last 72 hours.   Recent Labs      05/20/17   0445  05/19/17   0809  05/18/17   2148   TROIQ  <0.04  <0.04  <0.04     Lab Results   Component Value Date/Time    Cholesterol, total 105 03/19/2016 08:23 AM    HDL Cholesterol 32 03/19/2016 08:23 AM    LDL, calculated 56.6 03/19/2016 08:23 AM    Triglyceride 82 03/19/2016 08:23 AM    CHOL/HDL Ratio 3.3 03/19/2016 08:23 AM     Lab Results   Component Value Date/Time    Glucose (POC) 105 05/21/2017 11:38 AM    Glucose (POC) 138 05/21/2017 07:30 AM    Glucose (POC) 115 05/20/2017 09:18 PM    Glucose (POC) 274 05/20/2017 04:52 PM    Glucose (POC) 96 05/20/2017 12:30 PM     Lab Results   Component Value Date/Time    Color YELLOW/STRAW 05/18/2017 08:08 PM    Appearance CLEAR 05/18/2017 08:08 PM    Specific gravity 1.027 05/18/2017 08:08 PM    Specific gravity 1.010 06/13/2015 05:36 PM    pH (UA) 5.5 05/18/2017 08:08 PM    Protein NEGATIVE  05/18/2017 08:08 PM    Glucose >1000 05/18/2017 08:08 PM    Ketone >80 05/18/2017 08:08 PM    Bilirubin NEGATIVE  05/18/2017 08:08 PM    Urobilinogen 0.2 05/18/2017 08:08 PM    Nitrites NEGATIVE  05/18/2017 08:08 PM    Leukocyte Esterase NEGATIVE  05/18/2017 08:08 PM    Epithelial cells FEW 05/18/2017 08:08 PM    Bacteria NEGATIVE  05/18/2017 08:08 PM    WBC 0-4 05/18/2017 08:08 PM    RBC 0-5 05/18/2017 08:08 PM         Medications Reviewed:     Current Facility-Administered Medications   Medication Dose Route Frequency    insulin NPH (NOVOLIN N, HUMULIN N) injection 32 Units  32 Units SubCUTAneous ACB    insulin NPH (NOVOLIN N, HUMULIN N) injection 40 Units  40 Units SubCUTAneous ACD    0.9% sodium chloride infusion  125 mL/hr IntraVENous CONTINUOUS    insulin lispro (HUMALOG) injection   SubCUTAneous AC&HS    insulin lispro (HUMALOG) injection 8 Units  8 Units SubCUTAneous TIDAC    sodium chloride (NS) flush 5-10 mL  5-10 mL IntraVENous PRN    dextrose 10 % infusion 125-250 mL  125-250 mL IntraVENous PRN    aspirin chewable tablet 81 mg  81 mg Oral DAILY    busPIRone (BUSPAR) tablet 10 mg  10 mg Oral BID    divalproex ER (DEPAKOTE ER) 24 hour tablet 500 mg  500 mg Oral TID    mirtazapine (REMERON) tablet 30 mg  30 mg Oral QHS    ziprasidone (GEODON) capsule 80 mg  80 mg Oral BID WITH MEALS    sodium chloride (NS) flush 5-10 mL  5-10 mL IntraVENous Q8H    sodium chloride (NS) flush 5-10 mL  5-10 mL IntraVENous PRN    glucose chewable tablet 16 g  4 Tab Oral PRN    glucagon (GLUCAGEN) injection 1 mg  1 mg IntraMUSCular PRN    morphine injection 1 mg  1 mg IntraVENous Q4H PRN    ondansetron (ZOFRAN) injection 4 mg  4 mg IntraVENous Q8H PRN     ______________________________________________________________________  EXPECTED LENGTH OF STAY: 3d 0h  ACTUAL LENGTH OF STAY:          1700 Lg Valenzuela DO   Critical care time spent with patient: 35 minutes